# Patient Record
Sex: FEMALE | Race: BLACK OR AFRICAN AMERICAN | NOT HISPANIC OR LATINO | Employment: FULL TIME | URBAN - METROPOLITAN AREA
[De-identification: names, ages, dates, MRNs, and addresses within clinical notes are randomized per-mention and may not be internally consistent; named-entity substitution may affect disease eponyms.]

---

## 2018-01-08 PROBLEM — E88.810 DYSMETABOLIC SYNDROME X: Status: ACTIVE | Noted: 2018-01-08

## 2018-01-08 PROBLEM — G43.009 MIGRAINE WITHOUT AURA AND WITHOUT STATUS MIGRAINOSUS, NOT INTRACTABLE: Status: ACTIVE | Noted: 2018-01-08

## 2018-12-04 PROBLEM — R53.83 OTHER FATIGUE: Status: ACTIVE | Noted: 2018-12-04

## 2019-02-13 PROBLEM — K90.9 INTESTINAL MALABSORPTION: Status: ACTIVE | Noted: 2019-02-13

## 2019-02-13 PROBLEM — R79.89 ABNORMAL THYROID BLOOD TEST: Status: ACTIVE | Noted: 2019-02-13

## 2019-02-14 PROBLEM — E87.6 HYPOKALEMIA: Status: ACTIVE | Noted: 2019-02-14

## 2019-03-14 PROBLEM — E51.9 THIAMINE DEFICIENCY: Status: ACTIVE | Noted: 2019-03-14

## 2022-03-31 PROBLEM — D50.8 IRON DEFICIENCY ANEMIA SECONDARY TO INADEQUATE DIETARY IRON INTAKE: Status: ACTIVE | Noted: 2022-03-31

## 2022-09-19 PROBLEM — E55.9 VITAMIN D DEFICIENCY: Status: ACTIVE | Noted: 2022-09-19

## 2022-09-19 PROBLEM — E04.1 THYROID NODULE: Status: ACTIVE | Noted: 2022-09-19

## 2023-07-18 PROBLEM — F32.0 CURRENT MILD EPISODE OF MAJOR DEPRESSIVE DISORDER WITHOUT PRIOR EPISODE: Status: ACTIVE | Noted: 2023-07-18

## 2023-08-28 ENCOUNTER — TELEPHONE (OUTPATIENT)
Dept: PRIMARY CARE CLINIC | Facility: CLINIC | Age: 43
End: 2023-08-28
Payer: COMMERCIAL

## 2023-08-28 NOTE — TELEPHONE ENCOUNTER
----- Message from Gini Barr sent at 8/28/2023 12:10 PM CDT -----  Type :  Needs Medical Advice    Who Called:  pt   Symptoms (please be specific): est  How long has patient had these symptoms:   est   Pharmacy name and phone #:   no   Would the patient rather a call back or a response via [facility]?  Call   Best Call Back Number:[phone]  Additional Information: appt

## 2023-08-29 ENCOUNTER — TELEPHONE (OUTPATIENT)
Dept: PRIMARY CARE CLINIC | Facility: CLINIC | Age: 43
End: 2023-08-29
Payer: COMMERCIAL

## 2023-08-29 NOTE — TELEPHONE ENCOUNTER
----- Message from Cristal Swanson sent at 8/29/2023  9:04 AM CDT -----  Pt would like to be called back regarding  appt    Pt can be reached at  [phone]

## 2023-09-11 ENCOUNTER — PATIENT MESSAGE (OUTPATIENT)
Dept: PRIMARY CARE CLINIC | Facility: CLINIC | Age: 43
End: 2023-09-11

## 2023-09-11 ENCOUNTER — OFFICE VISIT (OUTPATIENT)
Dept: PRIMARY CARE CLINIC | Facility: CLINIC | Age: 43
End: 2023-09-11
Payer: COMMERCIAL

## 2023-09-11 VITALS
HEIGHT: 70 IN | HEART RATE: 92 BPM | DIASTOLIC BLOOD PRESSURE: 82 MMHG | BODY MASS INDEX: 28.06 KG/M2 | SYSTOLIC BLOOD PRESSURE: 117 MMHG | WEIGHT: 196 LBS

## 2023-09-11 DIAGNOSIS — Z98.84 BARIATRIC SURGERY STATUS: ICD-10-CM

## 2023-09-11 DIAGNOSIS — K59.00 CONSTIPATION, UNSPECIFIED CONSTIPATION TYPE: ICD-10-CM

## 2023-09-11 DIAGNOSIS — R63.5 WEIGHT GAIN, ABNORMAL: ICD-10-CM

## 2023-09-11 DIAGNOSIS — E05.90 SUBCLINICAL HYPERTHYROIDISM: ICD-10-CM

## 2023-09-11 DIAGNOSIS — E78.2 MIXED HYPERLIPIDEMIA: ICD-10-CM

## 2023-09-11 DIAGNOSIS — E55.9 VITAMIN D DEFICIENCY: ICD-10-CM

## 2023-09-11 DIAGNOSIS — E04.1 THYROID NODULE: ICD-10-CM

## 2023-09-11 DIAGNOSIS — G43.009 MIGRAINE WITHOUT AURA AND WITHOUT STATUS MIGRAINOSUS, NOT INTRACTABLE: Primary | ICD-10-CM

## 2023-09-11 DIAGNOSIS — F32.A ANXIETY AND DEPRESSION: ICD-10-CM

## 2023-09-11 DIAGNOSIS — Z86.39 HISTORY OF OBESITY: ICD-10-CM

## 2023-09-11 DIAGNOSIS — F41.9 ANXIETY AND DEPRESSION: ICD-10-CM

## 2023-09-11 PROBLEM — E87.6 HYPOKALEMIA: Status: RESOLVED | Noted: 2019-02-14 | Resolved: 2023-09-11

## 2023-09-11 PROBLEM — R53.83 OTHER FATIGUE: Status: RESOLVED | Noted: 2018-12-04 | Resolved: 2023-09-11

## 2023-09-11 PROBLEM — R79.89 ABNORMAL THYROID BLOOD TEST: Status: RESOLVED | Noted: 2019-02-13 | Resolved: 2023-09-11

## 2023-09-11 PROBLEM — F32.0 CURRENT MILD EPISODE OF MAJOR DEPRESSIVE DISORDER WITHOUT PRIOR EPISODE: Status: RESOLVED | Noted: 2023-07-18 | Resolved: 2023-09-11

## 2023-09-11 PROBLEM — E88.810 DYSMETABOLIC SYNDROME X: Status: RESOLVED | Noted: 2018-01-08 | Resolved: 2023-09-11

## 2023-09-11 PROBLEM — K90.9 INTESTINAL MALABSORPTION: Status: RESOLVED | Noted: 2019-02-13 | Resolved: 2023-09-11

## 2023-09-11 PROBLEM — D50.8 IRON DEFICIENCY ANEMIA SECONDARY TO INADEQUATE DIETARY IRON INTAKE: Status: RESOLVED | Noted: 2022-03-31 | Resolved: 2023-09-11

## 2023-09-11 PROBLEM — E51.9 THIAMINE DEFICIENCY: Status: RESOLVED | Noted: 2019-03-14 | Resolved: 2023-09-11

## 2023-09-11 PROCEDURE — 99205 OFFICE O/P NEW HI 60 MIN: CPT | Mod: 95,,, | Performed by: FAMILY MEDICINE

## 2023-09-11 PROCEDURE — 99205 PR OFFICE/OUTPT VISIT, NEW, LEVL V, 60-74 MIN: ICD-10-PCS | Mod: 95,,, | Performed by: FAMILY MEDICINE

## 2023-09-11 RX ORDER — SEMAGLUTIDE 0.5 MG/.5ML
0.5 INJECTION, SOLUTION SUBCUTANEOUS
Qty: 4 EACH | Refills: 5 | Status: SHIPPED | OUTPATIENT
Start: 2023-09-11 | End: 2023-11-07

## 2023-09-11 RX ORDER — PLECANATIDE 3 MG/1
3 TABLET ORAL DAILY
Qty: 30 TABLET | Refills: 5 | Status: SHIPPED | OUTPATIENT
Start: 2023-09-11 | End: 2023-09-28

## 2023-09-11 RX ORDER — ATOGEPANT 60 MG/1
60 TABLET ORAL DAILY
Qty: 30 TABLET | Refills: 5 | Status: SHIPPED | OUTPATIENT
Start: 2023-09-11 | End: 2023-11-29

## 2023-09-11 RX ORDER — BUPROPION HYDROCHLORIDE 100 MG/1
100 TABLET ORAL 2 TIMES DAILY
Qty: 60 TABLET | Refills: 5 | Status: SHIPPED | OUTPATIENT
Start: 2023-09-11 | End: 2024-02-02

## 2023-09-11 NOTE — ASSESSMENT & PLAN NOTE
Patient is having more weight regain, we will start GLP therapy as Ozempic was doing really good in helping her maintain her weight loss.

## 2023-09-11 NOTE — PROGRESS NOTES
"        [facility]       [address] [place] [address]      [phone]     Analia Foster MD         .      TELEMEDICINE VISIT  09/11/2023    Laura Haddad is a 43 y.o. female who presents for Telemed visit.   The patient location is: [place]    The chief complaint leading to consultation is: is wanting to re establish care from [facility] many years ago.  She was unhappy with the NP she was seeing at my old clinic abnd had issues with how messages was handled. Patient was seeing dr ca in the interim, she is wanting to re establish care. She was started on januvia but it caused her side effects.  Patient is not taking metformin. Patient states her a1c was normal.  She was on ozempic but insurance wouldn't cover.  She is concerned about weight regain. She thinks that wellbutrin she started over the summer was cause of the weight gain. When she reached out to dr ca, she was dismissed and advised to find "someone else" to help her needs.    She was at max weight 330#, 290# before gastric surgery in 2018, and then her lowest weight of 155#. Weight regain started when insurance stopped covering meds and she gained over 20# since this.  This has greatly affected her mood and cost some depressive symptoms and she wants to get the weight back down again.    REVIEW OF SYMPTOMS  All negative except as stated above.      PHYSICAL EXAM:  Alert and awake, Normal appearance, and overweight/obese  Normocephalic, Atraumatic , and Normal facies  EOMI intact and Clear conjunctivae  Normal Ears, Nares patent, and Throat WNL  No cyanosis and No labored breathing  Normal Abdomen, No tenderness/guarding, and Increased Abdominal Girth      Normal mood, Normal affect, Normal behavior, Congruent sppech, Normal memory, Normal speech, depressed, and concerned      ASSESSMENT AND PLAN      1. Migraine without aura and without status migrainosus, not " intractable  Assessment & Plan:  Currently taking Topamax and does still have more migraines.  She does have and a desirable side effects with Topamax.  We will try Qulipta to take every day and then try to taper off the Topamax if tolerated    Orders:  -     atogepant (QULIPTA) 60 mg Tab; Take 60 mg by mouth once daily.  Dispense: 30 tablet; Refill: 5    2. Bariatric surgery status  Overview:  Gastric sleeve in 2018.  Maximum weight 330 lb    Assessment & Plan:  Patient is having more weight regain, we will start GLP therapy as Ozempic was doing really good in helping her maintain her weight loss.    Orders:  -     Anti-Thyroglobulin Antibody; Future; Expected date: 09/11/2023  -     TSH; Future; Expected date: 09/11/2023  -     T4, Free; Future; Expected date: 09/11/2023  -     T3, Free; Future; Expected date: 09/11/2023  -     Thyroid Peroxidase Antibody; Future; Expected date: 09/11/2023  -     C-Peptide; Future; Expected date: 09/11/2023  -     Hemoglobin A1C; Future; Expected date: 09/11/2023  -     CBC Auto Differential; Future; Expected date: 09/11/2023  -     Comprehensive Metabolic Panel; Future; Expected date: 09/11/2023  -     Lipoprotein Analysis, by NMR; Future; Expected date: 09/11/2023  -     Vitamin D 25-Hydroxy; Future; Expected date: 09/11/2023  -     Ferritin; Future; Expected date: 09/11/2023  -     Folate; Future; Expected date: 09/11/2023  -     Iron; Future; Expected date: 09/11/2023  -     Vitamin B12; Future; Expected date: 09/11/2023    3. Weight gain, abnormal  -     Anti-Thyroglobulin Antibody; Future; Expected date: 09/11/2023  -     TSH; Future; Expected date: 09/11/2023  -     T4, Free; Future; Expected date: 09/11/2023  -     T3, Free; Future; Expected date: 09/11/2023  -     Thyroid Peroxidase Antibody; Future; Expected date: 09/11/2023  -     C-Peptide; Future; Expected date: 09/11/2023  -     Hemoglobin A1C; Future; Expected date: 09/11/2023  -     CBC Auto Differential; Future;  Expected date: 09/11/2023  -     Comprehensive Metabolic Panel; Future; Expected date: 09/11/2023  -     Lipoprotein Analysis, by NMR; Future; Expected date: 09/11/2023  -     Vitamin D 25-Hydroxy; Future; Expected date: 09/11/2023  -     Ferritin; Future; Expected date: 09/11/2023  -     Folate; Future; Expected date: 09/11/2023  -     Iron; Future; Expected date: 09/11/2023  -     Vitamin B12; Future; Expected date: 09/11/2023    4. Constipation, unspecified constipation type  Assessment & Plan:  Not improved with water and as needed MiraLax.  We will try Trulance    Orders:  -     plecanatide (TRULANCE) 3 mg Tab; Take 3 mg by mouth once daily.  Dispense: 30 tablet; Refill: 5    5. Mixed hyperlipidemia  Assessment & Plan:  Previously abnormal triglycerides and HDL.  We will consider look at advanced lipids next time    Orders:  -     Anti-Thyroglobulin Antibody; Future; Expected date: 09/11/2023  -     TSH; Future; Expected date: 09/11/2023  -     T4, Free; Future; Expected date: 09/11/2023  -     T3, Free; Future; Expected date: 09/11/2023  -     Thyroid Peroxidase Antibody; Future; Expected date: 09/11/2023  -     C-Peptide; Future; Expected date: 09/11/2023  -     Hemoglobin A1C; Future; Expected date: 09/11/2023  -     CBC Auto Differential; Future; Expected date: 09/11/2023  -     Comprehensive Metabolic Panel; Future; Expected date: 09/11/2023  -     Lipoprotein Analysis, by NMR; Future; Expected date: 09/11/2023  -     Vitamin D 25-Hydroxy; Future; Expected date: 09/11/2023  -     Ferritin; Future; Expected date: 09/11/2023  -     Folate; Future; Expected date: 09/11/2023  -     Iron; Future; Expected date: 09/11/2023  -     Vitamin B12; Future; Expected date: 09/11/2023    6. Anxiety and depression  Assessment & Plan:  Currently on Wellbutrin, but given history of gastric sleeve, may want to try doing immediate release to see if this gives her better effect.  Okay to just take once daily if sleep is an  issue    Orders:  -     buPROPion (WELLBUTRIN) 100 MG tablet; Take 1 tablet (100 mg total) by mouth 2 (two) times daily.  Dispense: 60 tablet; Refill: 5    7. History of obesity  Overview:  There are multiple etiologies of weight gain. A  weight loss plan that focuses on diet, exercise and good healthy lifestyle changes are a necessity to help combat obesity. Medication and/orsurgical options are available;  unfortunately,  many options may not be approved by insurance nor FDA approved for obesity but could be very beneficial. There are possible risks associated with therapeutic options and patient should notify us of any concerns. Patients should adhere to plan and follow up routinely as directed.     Assessment & Plan:  May want to consider Lomaira if we go we is not covered.  She is previously done this and did well on this.    Orders:  -     semaglutide, weight loss, (WEGOVY) 0.5 mg/0.5 mL PnIj; Inject 0.5 mg into the skin every 7 days.  Dispense: 4 each; Refill: 5    8. Subclinical hyperthyroidism  Comments:  We will get full panel.  Orders:  -     Anti-Thyroglobulin Antibody; Future; Expected date: 09/11/2023  -     TSH; Future; Expected date: 09/11/2023  -     T4, Free; Future; Expected date: 09/11/2023  -     T3, Free; Future; Expected date: 09/11/2023  -     Thyroid Peroxidase Antibody; Future; Expected date: 09/11/2023  -     C-Peptide; Future; Expected date: 09/11/2023  -     Hemoglobin A1C; Future; Expected date: 09/11/2023  -     CBC Auto Differential; Future; Expected date: 09/11/2023  -     Comprehensive Metabolic Panel; Future; Expected date: 09/11/2023  -     Lipoprotein Analysis, by NMR; Future; Expected date: 09/11/2023  -     Vitamin D 25-Hydroxy; Future; Expected date: 09/11/2023  -     Ferritin; Future; Expected date: 09/11/2023  -     Folate; Future; Expected date: 09/11/2023  -     Iron; Future; Expected date: 09/11/2023  -     Vitamin B12; Future; Expected date: 09/11/2023    9. Vitamin D  deficiency  Assessment & Plan:  On replacement therapy    Orders:  -     Anti-Thyroglobulin Antibody; Future; Expected date: 09/11/2023  -     TSH; Future; Expected date: 09/11/2023  -     T4, Free; Future; Expected date: 09/11/2023  -     T3, Free; Future; Expected date: 09/11/2023  -     Thyroid Peroxidase Antibody; Future; Expected date: 09/11/2023  -     C-Peptide; Future; Expected date: 09/11/2023  -     Hemoglobin A1C; Future; Expected date: 09/11/2023  -     CBC Auto Differential; Future; Expected date: 09/11/2023  -     Comprehensive Metabolic Panel; Future; Expected date: 09/11/2023  -     Lipoprotein Analysis, by NMR; Future; Expected date: 09/11/2023  -     Vitamin D 25-Hydroxy; Future; Expected date: 09/11/2023  -     Ferritin; Future; Expected date: 09/11/2023  -     Folate; Future; Expected date: 09/11/2023  -     Iron; Future; Expected date: 09/11/2023  -     Vitamin B12; Future; Expected date: 09/11/2023    10. Thyroid nodule  Assessment & Plan:  Repeated abnormal TSH with normal T4 and T3.  We will get full thyroid panel next time.        We will make a few medication changes to see if we can get the weight loss efforts back down.  We will also get patient to upload labs and then if nothing more is needed, we will plan to put labs in to do an next three months.  Interested to see what her thyroid status looks like.  Also we will look at her nutritional levels given her history of bariatric surgery.  We will see if we can get GLP covered for obesity as opposed to for diabetes/prediabetes    I spent a total of 60 minutes face to face and non-face to face on the date of this visit.This includes time preparing to see the patient (eg, review of tests, notes), obtaining and/or reviewing additional history from an independent historian and/or outside medical records, documenting clinical information in the electronic health record, independently interpreting results and/or communicating results to the  "patient/family/caregiver, or care coordinator.    Disclaimer: Portions of this record may have been created with voice recognition software. Occasional wrong-word or "sound-a-like" substitutions may have occurred due to inherent limitations of voice recognition software. Read the chart carefully and recognize, using context, where substitutions have occurred."    Visit type: Telemedicine:audio and visual  Each patient to whom he or she provides medical services by telemedicine is:  (1) informed of the relationship between the physician and patient and the respective role of any other health care provider with respect to management of the patient; and (2) notified that he or she may decline to receive medical services by telemedicine and may withdraw from such care at any time.    Signed by:  Analia Foster MD         "

## 2023-09-11 NOTE — ASSESSMENT & PLAN NOTE
May want to consider Lomaira if we go we is not covered.  She is previously done this and did well on this.

## 2023-09-11 NOTE — ASSESSMENT & PLAN NOTE
Currently on Wellbutrin, but given history of gastric sleeve, may want to try doing immediate release to see if this gives her better effect.  Okay to just take once daily if sleep is an issue

## 2023-09-11 NOTE — ASSESSMENT & PLAN NOTE
Currently taking Topamax and does still have more migraines.  She does have and a desirable side effects with Topamax.  We will try Qulipta to take every day and then try to taper off the Topamax if tolerated

## 2023-09-18 ENCOUNTER — PATIENT MESSAGE (OUTPATIENT)
Dept: PRIMARY CARE CLINIC | Facility: CLINIC | Age: 43
End: 2023-09-18
Payer: COMMERCIAL

## 2023-09-18 DIAGNOSIS — R11.0 NAUSEA: Primary | ICD-10-CM

## 2023-09-18 RX ORDER — ONDANSETRON HYDROCHLORIDE 8 MG/1
8 TABLET, FILM COATED ORAL EVERY 8 HOURS PRN
Qty: 30 TABLET | Refills: 3 | Status: SHIPPED | OUTPATIENT
Start: 2023-09-18

## 2023-09-19 RX ORDER — ONDANSETRON 8 MG/1
8 TABLET, ORALLY DISINTEGRATING ORAL 2 TIMES DAILY
OUTPATIENT
Start: 2023-09-19

## 2023-09-19 RX ORDER — ONDANSETRON 8 MG/1
8 TABLET, ORALLY DISINTEGRATING ORAL 2 TIMES DAILY
COMMUNITY
End: 2023-09-28 | Stop reason: SDUPTHER

## 2023-09-27 ENCOUNTER — PATIENT MESSAGE (OUTPATIENT)
Dept: PRIMARY CARE CLINIC | Facility: CLINIC | Age: 43
End: 2023-09-27
Payer: COMMERCIAL

## 2023-09-27 DIAGNOSIS — R11.0 NAUSEA: Primary | ICD-10-CM

## 2023-09-28 RX ORDER — ONDANSETRON 8 MG/1
8 TABLET, ORALLY DISINTEGRATING ORAL 2 TIMES DAILY
Qty: 30 TABLET | Refills: 0 | Status: SHIPPED | OUTPATIENT
Start: 2023-09-28 | End: 2023-11-02

## 2023-10-19 ENCOUNTER — PATIENT MESSAGE (OUTPATIENT)
Dept: PRIMARY CARE CLINIC | Facility: CLINIC | Age: 43
End: 2023-10-19
Payer: COMMERCIAL

## 2023-10-31 DIAGNOSIS — R11.0 NAUSEA: ICD-10-CM

## 2023-11-02 RX ORDER — ONDANSETRON 8 MG/1
8 TABLET, ORALLY DISINTEGRATING ORAL EVERY 6 HOURS PRN
Qty: 30 TABLET | Refills: 0 | Status: SHIPPED | OUTPATIENT
Start: 2023-11-02 | End: 2023-11-13

## 2023-11-07 ENCOUNTER — PATIENT MESSAGE (OUTPATIENT)
Dept: PRIMARY CARE CLINIC | Facility: CLINIC | Age: 43
End: 2023-11-07
Payer: COMMERCIAL

## 2023-11-07 DIAGNOSIS — Z98.84 BARIATRIC SURGERY STATUS: Primary | ICD-10-CM

## 2023-11-07 RX ORDER — SEMAGLUTIDE 1 MG/.5ML
1 INJECTION, SOLUTION SUBCUTANEOUS
Qty: 4 EACH | Refills: 5 | Status: SHIPPED | OUTPATIENT
Start: 2023-11-07 | End: 2024-01-08

## 2023-11-12 DIAGNOSIS — R11.0 NAUSEA: ICD-10-CM

## 2023-11-13 RX ORDER — ONDANSETRON 8 MG/1
TABLET, ORALLY DISINTEGRATING ORAL
Qty: 30 TABLET | Refills: 1 | Status: SHIPPED | OUTPATIENT
Start: 2023-11-13 | End: 2024-01-11 | Stop reason: SDUPTHER

## 2024-01-08 ENCOUNTER — PATIENT MESSAGE (OUTPATIENT)
Dept: PRIMARY CARE CLINIC | Facility: CLINIC | Age: 44
End: 2024-01-08
Payer: COMMERCIAL

## 2024-01-08 DIAGNOSIS — Z86.39 HISTORY OF OBESITY: Primary | ICD-10-CM

## 2024-01-08 RX ORDER — SEMAGLUTIDE 1.7 MG/.75ML
1.7 INJECTION, SOLUTION SUBCUTANEOUS
Qty: 4 EACH | Refills: 5 | Status: SHIPPED | OUTPATIENT
Start: 2024-01-08 | End: 2024-02-02

## 2024-01-08 RX ORDER — ONDANSETRON 8 MG/1
8 TABLET, ORALLY DISINTEGRATING ORAL 2 TIMES DAILY
Qty: 30 TABLET | Refills: 0 | Status: SHIPPED | OUTPATIENT
Start: 2024-01-08

## 2024-01-10 DIAGNOSIS — R11.0 NAUSEA: ICD-10-CM

## 2024-01-11 RX ORDER — ONDANSETRON 8 MG/1
TABLET, ORALLY DISINTEGRATING ORAL
Qty: 30 TABLET | Refills: 0 | Status: SHIPPED | OUTPATIENT
Start: 2024-01-11

## 2024-02-01 ENCOUNTER — PATIENT MESSAGE (OUTPATIENT)
Dept: PRIMARY CARE CLINIC | Facility: CLINIC | Age: 44
End: 2024-02-01
Payer: COMMERCIAL

## 2024-02-01 DIAGNOSIS — Z98.84 BARIATRIC SURGERY STATUS: ICD-10-CM

## 2024-02-01 DIAGNOSIS — K21.9 GASTROESOPHAGEAL REFLUX DISEASE WITHOUT ESOPHAGITIS: Primary | ICD-10-CM

## 2024-02-01 RX ORDER — PANTOPRAZOLE SODIUM 20 MG/1
20 TABLET, DELAYED RELEASE ORAL DAILY
Qty: 30 TABLET | Refills: 11 | Status: SHIPPED | OUTPATIENT
Start: 2024-02-01 | End: 2024-03-25

## 2024-02-02 DIAGNOSIS — F32.A ANXIETY AND DEPRESSION: ICD-10-CM

## 2024-02-02 DIAGNOSIS — F41.9 ANXIETY AND DEPRESSION: ICD-10-CM

## 2024-02-02 RX ORDER — BUPROPION HYDROCHLORIDE 100 MG/1
100 TABLET ORAL 2 TIMES DAILY
Qty: 180 TABLET | Refills: 1 | Status: SHIPPED | OUTPATIENT
Start: 2024-02-02

## 2024-02-02 RX ORDER — SEMAGLUTIDE 1 MG/.5ML
1 INJECTION, SOLUTION SUBCUTANEOUS
Qty: 4 EACH | Refills: 5 | Status: SHIPPED | OUTPATIENT
Start: 2024-02-02

## 2024-03-18 ENCOUNTER — ON-DEMAND VIRTUAL (OUTPATIENT)
Dept: URGENT CARE | Facility: CLINIC | Age: 44
End: 2024-03-18
Payer: COMMERCIAL

## 2024-03-18 ENCOUNTER — PATIENT MESSAGE (OUTPATIENT)
Dept: PRIMARY CARE CLINIC | Facility: CLINIC | Age: 44
End: 2024-03-18
Payer: COMMERCIAL

## 2024-03-18 DIAGNOSIS — B37.0 THRUSH: Primary | ICD-10-CM

## 2024-03-18 PROCEDURE — 99213 OFFICE O/P EST LOW 20 MIN: CPT | Mod: 95,,, | Performed by: NURSE PRACTITIONER

## 2024-03-18 RX ORDER — NYSTATIN 100000 [USP'U]/ML
6 SUSPENSION ORAL 4 TIMES DAILY
Qty: 240 ML | Refills: 0 | Status: SHIPPED | OUTPATIENT
Start: 2024-03-18 | End: 2024-03-28

## 2024-03-18 NOTE — PROGRESS NOTES
Subjective:      Patient ID: Laura Haddad is a 43 y.o. female.    Vitals:  vitals were not taken for this visit.     Chief Complaint: No chief complaint on file.      Visit Type: TELE AUDIOVISUAL    Present with the patient at the time of consultation: TELEMED PRESENT WITH PATIENT: None        Past Medical History:   Diagnosis Date    Classical migraine     Fibromyalgia     Herpes simplex 2013    Insulin resistance     Mixed hyperlipidemia 9/11/2023    Overactive bladder     Seizure disorder     Subclinical hyperthyroidism 9/11/2023     Past Surgical History:   Procedure Laterality Date    ABDOMINOPLASTY      ADENOIDECTOMY  1981    ANTERIOR VAGINAL REPAIR  2006    arm lift  Bilateral     AUGMENTATION OF BREAST      BUTTOCK LIFT      CARPAL TUNNEL RELEASE  04/04/2023    CHOLECYSTECTOMY  2011    CYSTOSCOPY  09/15/2022    Dr. Monteiro    FOOT SURGERY      HAND SURGERY  04/2023    KNEE ARTHROSCOPY      LAPAROSCOPIC SLEEVE GASTRECTOMY  12/10/2018    LIPOSUCTION      REDUCTION OF BOTH BREASTS  2009    TONSILLECTOMY  1988    TVH  2001    uterine prolapse    TYMPANOSTOMY TUBE PLACEMENT  1981     Review of patient's allergies indicates:   Allergen Reactions    Bactrim [sulfamethoxazole-trimethoprim]     Ciprofloxacin Hives    Marcaine [bupivacaine]      Current Outpatient Medications on File Prior to Visit   Medication Sig Dispense Refill    buPROPion (WELLBUTRIN) 100 MG tablet Take 1 tablet (100 mg total) by mouth 2 (two) times daily. 180 tablet 1    ondansetron (ZOFRAN) 8 MG tablet Take 1 tablet (8 mg total) by mouth every 8 (eight) hours as needed for Nausea. 30 tablet 3    ondansetron (ZOFRAN-ODT) 8 MG TbDL Take 1 tablet (8 mg total) by mouth 2 (two) times daily. 30 tablet 0    ondansetron (ZOFRAN-ODT) 8 MG TbDL PLACE 1 TABLET ON THE TONGUE EVERY 6 HOURS AS NEEDED FOR NAUSEA 30 tablet 0    pantoprazole (PROTONIX) 20 MG tablet Take 1 tablet (20 mg total) by mouth once daily. 30 tablet 11    semaglutide, weight loss,  (WEGOVY) 1 mg/0.5 mL PnIj Inject 1 mg into the skin every 7 days. 4 each 5    topiramate (TOPAMAX) 100 MG tablet Take 1 tablet (100 mg total) by mouth 2 (two) times daily. 180 tablet 1    ubrogepant (UBRELVY) 100 mg tablet Take one tab with onset ofha ;repeat in 2 hours if still with sxs ; max  2 per 24 hours 60 tablet 5    valACYclovir (VALTREX) 500 MG tablet TAKE 1 TABLET DAILY FOR SUPPRESSION. TAKE 1 TABLET TWICE A DAY IF OUTBREAK 108 tablet 5     No current facility-administered medications on file prior to visit.     Family History   Problem Relation Age of Onset    Hyperlipidemia Mother     Diabetes Mother     Hypertension Mother     Heart disease Maternal Grandmother     Prostate cancer Other     Cancer Neg Hx        Medications Ordered                [facility] [place] [address] [address] [facility]   [address] [address]    Telephone: [phone]   Fax: [phone]   Hours: Not open 24 hours                         E-Prescribed (1 of 1)              nystatin (MYCOSTATIN) 100,000 unit/mL suspension    Sig: Take 6 mLs (600,000 Units total) by mouth 4 (four) times daily. for 10 days       Start: 3/18/24     Quantity: 240 mL Refills: 0                           Ohs Peq Odvv Intake    3/18/2024  9:33 AM CDT - Filed by Patient   What is your current physical address in the event of a medical emergency? [address]   Are you able to take your vital signs? No   Please attach any relevant images or files          42 yo with c/o sore throat and white patch on tongue. She is not diabetic. She does not use oral steroids. She states she has congestion. She states she tried to scrape it from her tongue and could not. She also c/o sore throat and some lymph node pain. She denies congestion, fever.         Constitution: Negative.   HENT: Negative.  Positive for tongue pain and tongue lesion.    Cardiovascular: Negative.    Respiratory: Negative.     Gastrointestinal:  Negative.    Endocrine: negative.   Genitourinary: Negative.  Negative for frequency and urgency.   Musculoskeletal: Negative.    Skin: Negative.    Allergic/Immunologic: Negative.    Neurological: Negative.    Hematologic/Lymphatic: Negative.    Psychiatric/Behavioral: Negative.          Objective:   The physical exam was conducted virtually.  LOCATION OF PATIENT work  Physical Exam   Constitutional: She is oriented to person, place, and time. She appears well-developed.   HENT:   Head: Normocephalic and atraumatic.   Ears:   Right Ear: Hearing, tympanic membrane and external ear normal.   Left Ear: Hearing, tympanic membrane and external ear normal.   Nose: Nose normal.   Mouth/Throat: Uvula is midline, oropharynx is clear and moist and mucous membranes are normal.   Eyes: Conjunctivae and EOM are normal. Pupils are equal, round, and reactive to light.   Neck: Neck supple.   Cardiovascular: Normal rate.   Pulmonary/Chest: Effort normal and breath sounds normal.   Musculoskeletal: Normal range of motion.         General: Normal range of motion.   Neurological: She is alert and oriented to person, place, and time.   Skin: Skin is warm.   Psychiatric: Her behavior is normal. Thought content normal.   Nursing note and vitals reviewed.      Assessment:     1. Thrush        Plan:   Discussed with patient that it is unlikely to be a yeast/thrush infection however will treat with nystatin.   I do suspect early viral illness.    Thank you for choosing [facility]!    Our goal in the [facility] is to always provide outstanding medical care. You may receive a survey by mail or e-mail in the next week regarding your experience today. We would greatly appreciate you completing and returning the survey. Your feedback provides us with a way to recognize our staff who provide very good care, and it helps us learn how to improve when your experience was below our aspiration of excellence.          We appreciate you trusting us with your medical care. We hope you feel better soon. We will be happy to take care of you for all of your future medical needs.    You must understand that you've received an Urgent Care treatment only and that you may be released before all your medical problems are known or treated. You, the patient, will arrange for follow up care as instructed.    Follow up with your PCP or specialty clinic as directed in the next 1-2 weeks if not improved or as needed.  You can call [phone] to schedule an appointment with the appropriate provider.    If your condition worsens we recommend that you receive another evaluation in person, with your primary care provider, urgent care or at the emergency room immediately or contact your primary medical clinics after hours call service to discuss your concerns.         Thrush  -     nystatin (MYCOSTATIN) 100,000 unit/mL suspension; Take 6 mLs (600,000 Units total) by mouth 4 (four) times daily. for 10 days  Dispense: 240 mL; Refill: 0

## 2024-03-18 NOTE — PATIENT INSTRUCTIONS
Thank you for choosing [facility]!    Our goal in the [facility] is to always provide outstanding medical care. You may receive a survey by mail or e-mail in the next week regarding your experience today. We would greatly appreciate you completing and returning the survey. Your feedback provides us with a way to recognize our staff who provide very good care, and it helps us learn how to improve when your experience was below our aspiration of excellence.         We appreciate you trusting us with your medical care. We hope you feel better soon. We will be happy to take care of you for all of your future medical needs.    You must understand that you've received an Urgent Care treatment only and that you may be released before all your medical problems are known or treated. You, the patient, will arrange for follow up care as instructed.    Follow up with your PCP or specialty clinic as directed in the next 1-2 weeks if not improved or as needed.  You can call [phone] to schedule an appointment with the appropriate provider.    If your condition worsens we recommend that you receive another evaluation in person, with your primary care provider, urgent care or at the emergency room immediately or contact your primary medical clinics after hours call service to discuss your concerns.

## 2024-03-23 ENCOUNTER — PATIENT MESSAGE (OUTPATIENT)
Dept: PRIMARY CARE CLINIC | Facility: CLINIC | Age: 44
End: 2024-03-23
Payer: COMMERCIAL

## 2024-03-23 DIAGNOSIS — Z98.84 BARIATRIC SURGERY STATUS: Primary | ICD-10-CM

## 2024-03-24 DIAGNOSIS — R11.0 NAUSEA: ICD-10-CM

## 2024-03-24 DIAGNOSIS — K21.9 GASTROESOPHAGEAL REFLUX DISEASE WITHOUT ESOPHAGITIS: ICD-10-CM

## 2024-03-25 RX ORDER — ONDANSETRON 8 MG/1
TABLET, ORALLY DISINTEGRATING ORAL
Qty: 30 TABLET | Refills: 0 | Status: SHIPPED | OUTPATIENT
Start: 2024-03-25

## 2024-03-25 RX ORDER — SEMAGLUTIDE 2.4 MG/.75ML
2.4 INJECTION, SOLUTION SUBCUTANEOUS
Qty: 4 EACH | Refills: 5 | Status: SHIPPED | OUTPATIENT
Start: 2024-03-25

## 2024-03-25 RX ORDER — PANTOPRAZOLE SODIUM 20 MG/1
20 TABLET, DELAYED RELEASE ORAL DAILY
Qty: 90 TABLET | Refills: 0 | Status: SHIPPED | OUTPATIENT
Start: 2024-03-25

## 2024-04-22 DIAGNOSIS — G43.519 MIGRAINE AURA, PERSISTENT, INTRACTABLE: ICD-10-CM

## 2024-04-22 RX ORDER — UBROGEPANT 100 MG/1
TABLET ORAL
Qty: 10 TABLET | Refills: 0 | Status: SHIPPED | OUTPATIENT
Start: 2024-04-22 | End: 2024-05-23

## 2024-04-28 RX ORDER — ONDANSETRON 8 MG/1
TABLET, ORALLY DISINTEGRATING ORAL
Qty: 30 TABLET | Refills: 44 | OUTPATIENT
Start: 2024-04-28

## 2024-05-21 DIAGNOSIS — G43.519 MIGRAINE AURA, PERSISTENT, INTRACTABLE: ICD-10-CM

## 2024-05-23 RX ORDER — UBROGEPANT 100 MG/1
TABLET ORAL
Qty: 10 TABLET | Refills: 0 | Status: SHIPPED | OUTPATIENT
Start: 2024-05-23

## 2025-03-19 ENCOUNTER — OFFICE VISIT (OUTPATIENT)
Dept: PRIMARY CARE CLINIC | Facility: CLINIC | Age: 45
End: 2025-03-19
Payer: COMMERCIAL

## 2025-03-19 VITALS
DIASTOLIC BLOOD PRESSURE: 82 MMHG | WEIGHT: 226.19 LBS | HEIGHT: 70 IN | RESPIRATION RATE: 18 BRPM | TEMPERATURE: 97 F | BODY MASS INDEX: 32.38 KG/M2 | SYSTOLIC BLOOD PRESSURE: 120 MMHG | HEART RATE: 88 BPM | OXYGEN SATURATION: 99 %

## 2025-03-19 DIAGNOSIS — B37.0 THRUSH: ICD-10-CM

## 2025-03-19 DIAGNOSIS — K21.9 GASTROESOPHAGEAL REFLUX DISEASE, UNSPECIFIED WHETHER ESOPHAGITIS PRESENT: ICD-10-CM

## 2025-03-19 DIAGNOSIS — R63.5 WEIGHT GAIN, ABNORMAL: Primary | ICD-10-CM

## 2025-03-19 DIAGNOSIS — G43.009 MIGRAINE WITHOUT AURA AND WITHOUT STATUS MIGRAINOSUS, NOT INTRACTABLE: ICD-10-CM

## 2025-03-19 DIAGNOSIS — Z98.84 BARIATRIC SURGERY STATUS: ICD-10-CM

## 2025-03-19 DIAGNOSIS — E55.9 VITAMIN D DEFICIENCY: ICD-10-CM

## 2025-03-19 DIAGNOSIS — K31.84 GASTROPARESIS: ICD-10-CM

## 2025-03-19 DIAGNOSIS — E78.2 MIXED HYPERLIPIDEMIA: ICD-10-CM

## 2025-03-19 DIAGNOSIS — N81.6 RECTOCELE: ICD-10-CM

## 2025-03-19 DIAGNOSIS — N32.81 OAB (OVERACTIVE BLADDER): ICD-10-CM

## 2025-03-19 DIAGNOSIS — Z76.89 ENCOUNTER TO ESTABLISH CARE: ICD-10-CM

## 2025-03-19 DIAGNOSIS — E66.09 CLASS 1 OBESITY DUE TO EXCESS CALORIES WITH BODY MASS INDEX (BMI) OF 32.0 TO 32.9 IN ADULT, UNSPECIFIED WHETHER SERIOUS COMORBIDITY PRESENT: ICD-10-CM

## 2025-03-19 DIAGNOSIS — R11.0 NAUSEA: ICD-10-CM

## 2025-03-19 DIAGNOSIS — E66.811 CLASS 1 OBESITY DUE TO EXCESS CALORIES WITH BODY MASS INDEX (BMI) OF 32.0 TO 32.9 IN ADULT, UNSPECIFIED WHETHER SERIOUS COMORBIDITY PRESENT: ICD-10-CM

## 2025-03-19 PROBLEM — M51.9 LUMBAR DISC DISEASE: Status: ACTIVE | Noted: 2018-06-01

## 2025-03-19 PROBLEM — M53.3 SACROILIAC JOINT PAIN: Status: ACTIVE | Noted: 2025-03-19

## 2025-03-19 PROBLEM — M25.559 HIP PAIN: Status: ACTIVE | Noted: 2025-03-19

## 2025-03-19 PROBLEM — M51.16 HERNIATION OF LUMBAR INTERVERTEBRAL DISC WITH RADICULOPATHY: Status: ACTIVE | Noted: 2025-03-19

## 2025-03-19 PROBLEM — G47.00 INSOMNIA: Status: ACTIVE | Noted: 2024-09-11

## 2025-03-19 PROBLEM — G43.909 MIGRAINE SYNDROME: Status: ACTIVE | Noted: 2024-12-27

## 2025-03-19 PROBLEM — Z86.39 HISTORY OF VITAMIN D DEFICIENCY: Status: ACTIVE | Noted: 2024-09-29

## 2025-03-19 PROCEDURE — 3074F SYST BP LT 130 MM HG: CPT | Mod: CPTII,S$GLB,, | Performed by: FAMILY MEDICINE

## 2025-03-19 PROCEDURE — G2211 COMPLEX E/M VISIT ADD ON: HCPCS | Mod: S$GLB,,, | Performed by: FAMILY MEDICINE

## 2025-03-19 PROCEDURE — 3079F DIAST BP 80-89 MM HG: CPT | Mod: CPTII,S$GLB,, | Performed by: FAMILY MEDICINE

## 2025-03-19 PROCEDURE — 99999 PR PBB SHADOW E&M-EST. PATIENT-LVL V: CPT | Mod: PBBFAC,,, | Performed by: FAMILY MEDICINE

## 2025-03-19 PROCEDURE — 3008F BODY MASS INDEX DOCD: CPT | Mod: CPTII,S$GLB,, | Performed by: FAMILY MEDICINE

## 2025-03-19 PROCEDURE — 99215 OFFICE O/P EST HI 40 MIN: CPT | Mod: S$GLB,,, | Performed by: FAMILY MEDICINE

## 2025-03-19 PROCEDURE — 1160F RVW MEDS BY RX/DR IN RCRD: CPT | Mod: CPTII,S$GLB,, | Performed by: FAMILY MEDICINE

## 2025-03-19 PROCEDURE — 1159F MED LIST DOCD IN RCRD: CPT | Mod: CPTII,S$GLB,, | Performed by: FAMILY MEDICINE

## 2025-03-19 RX ORDER — ONDANSETRON 8 MG/1
8 TABLET, ORALLY DISINTEGRATING ORAL EVERY 6 HOURS PRN
Qty: 30 TABLET | Refills: 11 | Status: SHIPPED | OUTPATIENT
Start: 2025-03-19

## 2025-03-19 RX ORDER — MIRABEGRON 25 MG/1
25 TABLET, FILM COATED, EXTENDED RELEASE ORAL DAILY
Qty: 30 TABLET | Refills: 11 | Status: SHIPPED | OUTPATIENT
Start: 2025-03-19 | End: 2026-03-19

## 2025-03-19 RX ORDER — PHENTERMINE HYDROCHLORIDE 37.5 MG/1
37.5 TABLET ORAL
Qty: 30 TABLET | Refills: 0 | Status: SHIPPED | OUTPATIENT
Start: 2025-03-19 | End: 2025-04-18

## 2025-03-19 RX ORDER — TOPIRAMATE 50 MG/1
1 TABLET, FILM COATED ORAL EVERY MORNING
COMMUNITY
Start: 2025-02-26 | End: 2025-03-19 | Stop reason: SDUPTHER

## 2025-03-19 RX ORDER — PHENTERMINE HYDROCHLORIDE 15 MG/1
15 CAPSULE ORAL EVERY MORNING
COMMUNITY
Start: 2025-02-26 | End: 2025-03-19

## 2025-03-19 RX ORDER — TOPIRAMATE 50 MG/1
50 TABLET, FILM COATED ORAL EVERY MORNING
Qty: 90 TABLET | Refills: 3 | Status: SHIPPED | OUTPATIENT
Start: 2025-03-19

## 2025-03-19 RX ORDER — ESOMEPRAZOLE MAGNESIUM 40 MG/1
40 CAPSULE, DELAYED RELEASE ORAL
Qty: 30 CAPSULE | Refills: 11 | Status: SHIPPED | OUTPATIENT
Start: 2025-03-19 | End: 2026-03-19

## 2025-03-19 NOTE — PROGRESS NOTES
Chief Complaint  Chief Complaint   Patient presents with    Establish Care       \[facility]\""  Laura Haddad is a 44 y.o. female with multiple medical diagnoses as listed in the medical history and problem list that presents for  in person visit. New patient to me. Has seen Dr. Thalia Foster at [facility] on 9/11/23    History of Present Illness    CHIEF COMPLAINT:  - Patient presents to establish care with a new PCP and to address weight management concerns.    HPI:  Patient is a 44-year-old female presenting to establish care with a new PCP. She was previously seeing Dr. Brianna Bojorquez and Dr. Maribel Foster. She reports ongoing weight management concerns. She had weight loss surgery in 2018 and lost a significant amount of weight. She was previously on Ozempic and then Wegovy, which helped her maintain a weight between 160-170 lbs. Since stopping these medications, her weight has increased. In December 2024, she weighed 210 lbs, and now weighs 225 lbs, a 15-pound gain in approximately 3 months.    She reports significant acid reflux symptoms, including acid regurgitation, frequent eructation, and substernal discomfort. These symptoms occur with any oral intake. She has been taking OTC medications along with her prescribed Protonix, effectively doubling her dose to 80mg daily without relief.    She mentions having a persistent post-nasal drip in her left nostril since having COVID-19 in August 2024, causing frequent nausea. She uses Zofran for nausea management. She was recently evaluated by an ENT specialist who did not identify any structural issues related to this symptom.    She reports recurrent oral thrush, for which she was evaluated by an ENT about 2 months ago. She was prescribed Diflucan and two other prescriptions, but the condition keeps recurring.    She has a rectocele and cystocele, diagnosed by Dr. Goldstein, who recommended she contact Dr. Lyons for further evaluation and a colonoscopy. She has not pursued this  due to concerns about potential surgery and her inability to take leave from her new job.    She is currently taking Phentermine 15mg for weight management but reports it is ineffective at suppressing her appetite. She is also taking Topamax 50mg daily, which she restarted recently after stopping it in early 2024. She had previously been on 100mg twice daily from 2012 to 2024 for migraine prevention but reports it is not currently impacting her migraines.    She denies any current thyroid issues, diabetes, or pre-diabetes.    MEDICATIONS:  - Topamax 50 mg, daily, for migraines  - Phentermine 15 mg, daily, for weight loss (patient reports it is not effective)  - Zofran, as needed, for nausea  - Detrol, for overactive bladder  - Protonix 20 mg, patient reports taking 80 mg daily (doubling up on 20 mg tablets and using OTC medications), for acid reflux  - Valtrex, 1 tablet daily for herpes suppression, 1 tablet twice daily for outbreaks  - Discontinued Wellbutrin  - Discontinued hydrochlorothiazide after trying it once, as patient reported it was not effective  - Discontinued Qysmia (phentermine and topiramate mix) due to possible side effect of blisters in the mouth  - Discontinued Topamax 100 mg twice daily in early 2024, previously taken from 2012 to 2024  - Discontinued Ozempic- denied by insurance   - Discontinued Wegovy - denied by insurance     PMH:  - Fibromyalgia  - Thyroid issues  - Vitamin D deficiency  - Migraines  - Gastroparesis  - Recurrent bacterial vaginosis (BV)  - Herpes simplex virus (HSV)  - Rectocele  - Cystocele  - Overactive bladder  - Hysterectomy: Complete, 2001    RECENT/REMOTE SURGICAL HISTORY:  - Total bilateral hysterectomy: 2001, ovaries retained  - Weight loss surgery (sleeve gastrectomy): 2018    SOCIAL HISTORY:  - 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   Daughter lives in [place], [place]  Marital status:  for 25 years          [facility]    3/12/2025  9:54 AM CDT - Filed by Patient   This questionnaire should take approximately 5 to 10 minutes to complete.  To begin, press Let's Begin and then press Continue. Let's Begin   On average, how many days per week do you engage in moderate to strenuous exercise (like a brisk walk)? 2 days   On average, how many minutes do you engage in exercise at this level? 30 min   Do you feel stress - tense, restless, nervous, or anxious, or unable to sleep at night because your mind is troubled all the time - these days? To some extent   How often do you feel lonely or isolated from those around you? Rarely   How often do you need to have someone help you when you read instructions, pamphlets, or other written material from your doctor or pharmacy? Never   How hard is it for you to pay for the very basics like food, housing, medical care, and heating? Not hard at all   In the past 12 months has the electric, gas, oil, or water company threatened to shut off services in your home? No   Within the past 12 months, you worried that your food would run out before you got the money to buy more. Never true   Within the past 12 months, the food you bought just didn't last and you didn't have money to get more. Never true   In the past 12 months, has lack of transportation kept you from medical appointments or from getting medications? No   In the past 12 months, has lack of transportation kept you from meetings, work, or from getting things needed for daily living? No   In the last 12 months, was there a time when you were not able to pay the mortgage or rent on time? No   In the past 12 months, how many times have you moved where you were living? 0   At any time in the past 12 months, were you homeless or living in a shelter (including now)? No   How often do you have a drink containing alcohol? Never   How many drinks containing alcohol do you have on a typical day when you are drinking? Patient does  "not drink   How often do you have six or more drinks on one occasion? Never            Pmh, Psh, Family Hx, Social Hx, HM updated in Epic Tabs today.    Review of Systems   Constitutional:  Positive for activity change, appetite change, fatigue and unexpected weight change.   HENT:  Negative for trouble swallowing and voice change.         Tongue pain    Eyes:  Negative for photophobia and visual disturbance.   Respiratory:  Negative for cough and shortness of breath.    Cardiovascular:  Negative for chest pain, palpitations and leg swelling.   Gastrointestinal:  Positive for constipation, nausea and rectal pain. Negative for abdominal distention, abdominal pain and vomiting.   Endocrine: Negative for polydipsia, polyphagia and polyuria.   Genitourinary:  Positive for pelvic pain. Negative for difficulty urinating.   Musculoskeletal:  Negative for arthralgias, back pain and myalgias.   Neurological:  Positive for headaches. Negative for tremors, weakness and light-headedness.   Psychiatric/Behavioral:  Negative for dysphoric mood and sleep disturbance. The patient is not nervous/anxious.         Objective:     Vitals:    03/19/25 0816   BP: 120/82   BP Location: Right arm   Patient Position: Sitting   Pulse: 88   Resp: 18   Temp: 97 °F (36.1 °C)   TempSrc: Tympanic   SpO2: 99%   Weight: 102.6 kg (226 lb 3.1 oz)   Height: 5' 10" (1.778 m)     Wt Readings from Last 10 Encounters:   03/19/25 102.6 kg (226 lb 3.1 oz)   01/08/25 94.8 kg (209 lb)   11/29/23 83.5 kg (184 lb)   09/28/23 86.2 kg (190 lb)   09/11/23 88.9 kg (196 lb)   07/18/23 83.9 kg (185 lb)   07/06/23 83 kg (183 lb)   04/11/23 83 kg (183 lb)   01/18/23 78.9 kg (174 lb)   09/19/22 78.9 kg (174 lb)     Physical Exam    TEST RESULTS:  - Thyroid: September 2024, normal  - Electrolytes and kidney function: December 2024, good  - Fasting insulin: December 2024, 4 (good)  - A1C: December 2024, 4.9 (normal)  - Iron studies: September 2024, good  - Vitamin D: " September 2024, 52 (very good)  - Lipid profile: April 2024, very good  IMAGING:  - Upper endoscopy: Results suggested possible hiatal hernia and gastroparesis       Physical Exam  Vitals reviewed.   Constitutional:       Appearance: Normal appearance. She is well-developed and well-groomed. She is obese.   HENT:      Head: Normocephalic and atraumatic.      Right Ear: Tympanic membrane and external ear normal.      Left Ear: Tympanic membrane and external ear normal.      Nose: Nose normal.      Mouth/Throat:      Mouth: Mucous membranes are moist.      Tongue: Lesions present.      Pharynx: Oropharynx is clear.        Comments: White patches on tongue consistent with thrush  Eyes:      Conjunctiva/sclera: Conjunctivae normal.      Pupils: Pupils are equal, round, and reactive to light.   Neck:      Thyroid: No thyromegaly.   Cardiovascular:      Rate and Rhythm: Normal rate and regular rhythm.      Heart sounds: Normal heart sounds. No murmur heard.     No friction rub. No gallop.   Pulmonary:      Effort: Pulmonary effort is normal. No respiratory distress.      Breath sounds: Normal breath sounds. No wheezing or rales.   Abdominal:      General: Bowel sounds are normal. There is no distension.      Palpations: Abdomen is soft.      Tenderness: There is no abdominal tenderness. There is no rebound.   Musculoskeletal:         General: Normal range of motion.      Cervical back: Normal range of motion and neck supple.   Lymphadenopathy:      Cervical: No cervical adenopathy.   Skin:     General: Skin is warm and dry.      Findings: No rash.   Neurological:      General: No focal deficit present.      Mental Status: She is alert and oriented to person, place, and time. Mental status is at baseline.   Psychiatric:         Attention and Perception: Attention and perception normal.         Mood and Affect: Mood and affect normal.         Speech: Speech normal.         Behavior: Behavior normal. Behavior is cooperative.          Thought Content: Thought content normal.         Cognition and Memory: Cognition and memory normal.         Judgment: Judgment normal.         Assessment:   LABS:   Lab Results   Component Value Date    HGBA1C 4.9 12/27/2024    HGBA1C 4.7 09/10/2024      Lab Results   Component Value Date    CHOL 174 07/03/2023     Lab Results   Component Value Date    LDLCALC 74 07/03/2023    LDLCALC 7 07/03/2023     Lab Results   Component Value Date    WBC 5.4 12/04/2018    HGB 12.9 12/04/2018    HCT 39.3 12/04/2018     12/04/2018    CHOL 174 07/03/2023    TRIG 38 (A) 07/03/2023    HDL 75 (A) 07/03/2023    ALT 19 12/27/2021    AST 22 12/27/2021     03/30/2023    K 3.7 03/30/2023     12/04/2018    CREATININE 0.83 03/30/2023    BUN 17 03/30/2023    CO2 26 03/30/2023    TSH 0.43 09/10/2024    INR 1.0 12/04/2018    HGBA1C 4.9 12/27/2024       Plan:   Assessment & Plan    R63.5 Weight gain, abnormal  Z98.84 Bariatric surgery status  Z86.39 History of obesity  G43.009 Migraine without aura and without status migrainosus, not intractable  E55.9 Vitamin D deficiency  E78.2 Mixed hyperlipidemia  R11.0 Nausea  K21.9 Gastroesophageal reflux disease, unspecified whether esophagitis present  K31.84 Gastroparesis  N81.6 Rectocele  N32.81 OAB (overactive bladder)  B37.0 Thrush    IMPRESSION:  - Assessed weight management history, including previous use of GLP-1 agonists and weight loss surgery.  - Considered current medications and potential impact on weight and GI symptoms.  - Evaluated thyroid function based on recent lab results, noting no current need for thyroid medication.  - Reviewed recent labs (A1C, fasting insulin, lipid profile), confirming no diabetes or prediabetes.  - Assessed reflux symptoms and considered potential gastroparesis.  - Evaluated current overactive bladder medication (Detrol) and potential contraindication with gastroparesis.  - Considered need for updated endoscopy and colonoscopy given  ongoing GI symptoms.  - Explained [facility] digital weight loss program, including benefits, cost savings, and accountability features.  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    PLAN SUMMARY:  - Started Nexium 40 mg daily, replacing Protonix  - Started Myrbetriq for overactive bladder after 1 week if stomach symptoms improve  - Restarted nystatin for oral thrush, to be kept refrigerated  - Continued Topamax 50 mg daily  - Discontinued Detrol due to potential contraindication with gastroparesis symptoms  - Increased Phentermine from 15 mg to 37.5 mg daily  - Refilled Zofran as needed for nausea  - Referred to colorectal surgeon for evaluation of rectocele and cystocele  - Referred to GI specialist for evaluation of reflux  - Referred to GI specialist for evaluation of potential gastroparesis  - Patient to sign up for [facility] digital weight loss program with 's insurance plan benefit for GLP Zepbound and Wegovy.   - Patient to implement workplace changes to support weight loss  - Follow up in 1 month to assess medication changes and weight loss progress    WEIGHT GAIN, ABNORMAL:  - Patient to sign up for [facility] digital weight loss program.  - Increased Phentermine from 15 mg to 37.5 mg daily.  - Follow up in 1 month to assess medication changes and progress with weight loss program.    MIGRAINE WITHOUT AURA AND WITHOUT STATUS MIGRAINOSUS, NOT INTRACTABLE:  - Continued Topamax 50 mg daily.    NAUSEA:  - Refilled Zofran as needed for nausea.    GASTROESOPHAGEAL REFLUX DISEASE, UNSPECIFIED WHETHER ESOPHAGITIS PRESENT:  - Started Nexium 40 mg daily, replacing Protonix.  - Referred to GI specialist (Dr. Ariela Chen or Dr. Shelby) for evaluation of reflux.    GASTROPARESIS:  - Discontinued Detrol due to potential contraindication with gastroparesis symptoms.  - Referred to GI specialist (Dr. Chen or Dr. Shelby) for evaluation of potential gastroparesis.    RECTOCELE:  - Referred  to colorectal surgeon (Dr. Godwin or Dr. Rico) for evaluation of rectocele and cystocele.    OAB (OVERACTIVE BLADDER):  - Started Myrbetriq for overactive bladder, to be initiated after 1 week if stomach symptoms improve.    THRUSH:  - Educated on importance of cleaning reusable straws and cups to prevent oral thrush.  - Patient to clean or replace reusable items, especially those used at work.  - For certain plastics, boiling water should be used to preve  nt yeast growth.  - Restarted nystatin for oral thrush, to be kept refrigerated.    LIFESTYLE CHANGES:  - Patient to implement workplace changes: remove snacks from desk to reduce mindless eating, set timer for hourly movement breaks, and increase water intake.       Laura was seen today for establish care.    Diagnoses and all orders for this visit:    Weight gain, abnormal  -     phentermine (ADIPEX-P) 37.5 mg tablet; Take 1 tablet (37.5 mg total) by mouth before breakfast.    Encounter to establish care    Gastroesophageal reflux disease, unspecified whether esophagitis present  -     esomeprazole (NEXIUM) 40 MG capsule; Take 1 capsule (40 mg total) by mouth before breakfast.  -     Ambulatory referral/consult to Gastroenterology; Future  -     Ambulatory referral/consult to Colorectal Surgery; Future    Bariatric surgery status  -     phentermine (ADIPEX-P) 37.5 mg tablet; Take 1 tablet (37.5 mg total) by mouth before breakfast.    Class 1 obesity due to excess calories with body mass index (BMI) of 32.0 to 32.9 in adult, unspecified whether serious comorbidity present  -     phentermine (ADIPEX-P) 37.5 mg tablet; Take 1 tablet (37.5 mg total) by mouth before breakfast.    OAB (overactive bladder)  -     mirabegron (MYRBETRIQ) 25 mg Tb24 ER tablet; Take 1 tablet (25 mg total) by mouth once daily.    Gastroparesis  -     Ambulatory referral/consult to Gastroenterology; Future    Rectocele  -     Ambulatory referral/consult to Colorectal Surgery;  Future    Nausea  -     ondansetron (ZOFRAN-ODT) 8 MG TbDL; Take 1 tablet (8 mg total) by mouth every 6 (six) hours as needed (nausea).    Migraine without aura and without status migrainosus, not intractable  -     topiramate (TOPAMAX) 50 MG tablet; Take 1 tablet (50 mg total) by mouth every morning.    Thrush    Vitamin D deficiency    Mixed hyperlipidemia        No image results found.    The 10-year ASCVD risk score (Chasity DK, et al., 2019) is: 0.4%    Values used to calculate the score:      Age: 44 years      Sex: Female      Is Non- : Yes      Diabetic: No      Tobacco smoker: No      Systolic Blood Pressure: 120 mmHg      Is BP treated: No      HDL Cholesterol: 75 mg/dL      Total Cholesterol: 174 mg/dL    Follow-up: Follow up in about 1 month (around 4/19/2025) for f/u OV Dr. Branch- 1mo .    I spent a total of  60     minutes face to face and non-face to face on the date of this visit.This includes time preparing to see the patient (eg, review of tests, notes), obtaining and/or reviewing additional history from an independent historian and/or outside medical records, documenting clinical information in the electronic health record, independently interpreting results and/or communicating results to the patient/family/caregiver, or care coordinator.  Visit today included increased complexity associated with the care of the episodic problem addressed and managing the longitudinal care of the patient due to the serious and/or complex managed problem(s).    This note was generated with the assistance of ambient listening technology. Verbal consent was obtained by the patient and accompanying visitor(s) for the recording of patient appointment to facilitate this note. I attest to having reviewed and edited the generated note for accuracy, though some syntax or spelling errors may persist. Please contact the author of this note for any clarification.       Patient Instructions   For the  enrollment with the Digital Weight Program through [facility] for employees:     [email or web address]    Go to [email or web address] and complete the consent form.     Then you will download the digital medicine application and add your initial weight.   After this the team will contact you regarding the program.

## 2025-03-19 NOTE — PATIENT INSTRUCTIONS
For the enrollment with the Digital Weight Program through [facility] for employees:     [email or web address]    Go to [email or web address] and complete the consent form.     Then you will download the digital medicine application and add your initial weight.   After this the team will contact you regarding the program.

## 2025-03-25 ENCOUNTER — PATIENT MESSAGE (OUTPATIENT)
Dept: PRIMARY CARE CLINIC | Facility: CLINIC | Age: 45
End: 2025-03-25
Payer: COMMERCIAL

## 2025-03-25 DIAGNOSIS — K31.84 GASTROPARESIS: ICD-10-CM

## 2025-03-25 DIAGNOSIS — Z98.84 BARIATRIC SURGERY STATUS: ICD-10-CM

## 2025-03-25 DIAGNOSIS — K21.9 GASTROESOPHAGEAL REFLUX DISEASE, UNSPECIFIED WHETHER ESOPHAGITIS PRESENT: Primary | ICD-10-CM

## 2025-03-27 NOTE — TELEPHONE ENCOUNTER
I have signed for the following orders AND/OR meds.  Please call the patient and ask the patient to schedule the testing AND/OR inform about any medications that were sent.      Orders Placed This Encounter   Procedures    Ambulatory referral/consult to Gastroenterology     Standing Status:   Future     Expected Date:   4/3/2025     Expiration Date:   4/27/2026     Referral Priority:   Routine     Referral Type:   Consultation     Referral Reason:   Specialty Services Required     Referred to Provider:   Dayami Shelby MD     Requested Specialty:   Gastroenterology     Number of Visits Requested:   1

## 2025-04-23 ENCOUNTER — OFFICE VISIT (OUTPATIENT)
Dept: PRIMARY CARE CLINIC | Facility: CLINIC | Age: 45
End: 2025-04-23
Payer: COMMERCIAL

## 2025-04-23 ENCOUNTER — PATIENT MESSAGE (OUTPATIENT)
Dept: PRIMARY CARE CLINIC | Facility: CLINIC | Age: 45
End: 2025-04-23

## 2025-04-23 VITALS
BODY MASS INDEX: 31.75 KG/M2 | RESPIRATION RATE: 18 BRPM | TEMPERATURE: 97 F | WEIGHT: 221.81 LBS | OXYGEN SATURATION: 97 % | SYSTOLIC BLOOD PRESSURE: 120 MMHG | HEART RATE: 86 BPM | HEIGHT: 70 IN | DIASTOLIC BLOOD PRESSURE: 80 MMHG

## 2025-04-23 DIAGNOSIS — Z78.9 MEDICATION INTOLERANCE: ICD-10-CM

## 2025-04-23 DIAGNOSIS — G43.009 MIGRAINE WITHOUT AURA AND WITHOUT STATUS MIGRAINOSUS, NOT INTRACTABLE: ICD-10-CM

## 2025-04-23 DIAGNOSIS — K21.9 GASTROESOPHAGEAL REFLUX DISEASE, UNSPECIFIED WHETHER ESOPHAGITIS PRESENT: ICD-10-CM

## 2025-04-23 DIAGNOSIS — Z98.84 BARIATRIC SURGERY STATUS: ICD-10-CM

## 2025-04-23 DIAGNOSIS — Z12.31 ENCOUNTER FOR SCREENING MAMMOGRAM FOR BREAST CANCER: ICD-10-CM

## 2025-04-23 DIAGNOSIS — R63.5 WEIGHT GAIN, ABNORMAL: Primary | ICD-10-CM

## 2025-04-23 DIAGNOSIS — K59.04 CHRONIC IDIOPATHIC CONSTIPATION: ICD-10-CM

## 2025-04-23 DIAGNOSIS — K31.84 GASTROPARESIS: ICD-10-CM

## 2025-04-23 PROCEDURE — 99215 OFFICE O/P EST HI 40 MIN: CPT | Mod: S$GLB,,, | Performed by: FAMILY MEDICINE

## 2025-04-23 PROCEDURE — 3074F SYST BP LT 130 MM HG: CPT | Mod: CPTII,S$GLB,, | Performed by: FAMILY MEDICINE

## 2025-04-23 PROCEDURE — 3079F DIAST BP 80-89 MM HG: CPT | Mod: CPTII,S$GLB,, | Performed by: FAMILY MEDICINE

## 2025-04-23 PROCEDURE — 99999 PR PBB SHADOW E&M-EST. PATIENT-LVL III: CPT | Mod: PBBFAC,,, | Performed by: FAMILY MEDICINE

## 2025-04-23 PROCEDURE — 3008F BODY MASS INDEX DOCD: CPT | Mod: CPTII,S$GLB,, | Performed by: FAMILY MEDICINE

## 2025-04-23 PROCEDURE — G2211 COMPLEX E/M VISIT ADD ON: HCPCS | Mod: S$GLB,,, | Performed by: FAMILY MEDICINE

## 2025-04-23 RX ORDER — RIMEGEPANT SULFATE 75 MG/75MG
75 TABLET, ORALLY DISINTEGRATING ORAL ONCE AS NEEDED
Qty: 24 TABLET | Refills: 3 | Status: SHIPPED | OUTPATIENT
Start: 2025-04-23 | End: 2025-04-26

## 2025-04-23 RX ORDER — PHENTERMINE HYDROCHLORIDE 15 MG/1
30 CAPSULE ORAL EVERY MORNING
Qty: 60 CAPSULE | Refills: 0 | Status: SHIPPED | OUTPATIENT
Start: 2025-04-23 | End: 2025-05-23

## 2025-04-23 NOTE — PATIENT INSTRUCTIONS
[facility], here is the contact information.     If you have specific questions about your statement, call [facility] Patient Account Customer Service Department Monday - Thursday from 7:30 a.m. to 6:00 p.m. and Friday from 8:00 a.m. and 4:30 p.m.    To contact us in writing:    [facility]  [address] [place] [address]    To contact us by telephone:    Phone: [phone]  Toll Free: [phone]    To contact us by email: [email or web address]

## 2025-04-24 NOTE — PROGRESS NOTES
Chief Complaint  Chief Complaint   Patient presents with    Follow-up     1 month        HPI  Laura Haddad is a 44 y.o. female with multiple medical diagnoses as listed in the medical history and problem list that presents for  in person visit.     History of Present Illness    CHIEF COMPLAINT:  - Patient presents for a follow-up visit to discuss weight management issues and medication adjustments.    HPI:  Patient is following up after an initial visit on March 19th for weight management issues. She had weight loss surgery in 2018 and was previously on Ozempic and Wegovy. Her weight increased after stopping these medications. In December 2014, she weighed 210 lbs and recently increased to 225 lbs, representing a 15-pound weight gain over the last 3 months. She also reported acid reflux symptoms.    Patient reports significant improvement in managing symptoms with Nexium replacing Plavix. She was prescribed Myrbetriq for overactive bladder symptoms during the last visit but did not fill the prescription and reports no current issues with overactive bladder.    For weight management, patient was prescribed Phentermine. She initially took 15mg daily without effect, then doubled the dose to 30mg daily using two 15mg capsules. She reports noticing a significant change with the increased dose, noting decreased appetite and making healthier food choices. She felt the tablet form was ineffective when she switched.    Patient continues to take Topamax 50mg daily for migraines. She reports a recent severe migraine episode at work that caused vomiting twice and dizziness. She took Zofran and Stanback for relief but states it was extremely severe. She believes it would have been better managed with Ubrelvy, which she previously found effective but is no longer covered by her insurance.    Patient reports chronic constipation, for which she takes Dulcolax once a week. She has tried Linzess in the past, prescribed by   Tavon, but found it ineffective. She also takes stool softeners daily without significant effect.    MEDICATIONS:  - Nexium for acid reflux, effective  - Topamax 50 mg, daily, for migraines  - Phentermine 15 mg, 2 capsules daily (30 mg total), for weight loss  - Zofran, as needed for nausea  - Dulcolax, once a week, for constipation  - Stool softeners, daily, for constipation (ineffective)  - Phentermine increased from 15 mg to 30 mg daily (patient doubled dose)  - Discontinued Ozempic  - Discontinued Wegovy  - Discontinued Ubrelvy for migraines    PMH:  - Weight management issues  - Acid reflux  - Overactive bladder  - Migraines  - Gastroparesis with constipation  - Chronic constipation    RECENT/REMOTE SURGICAL HISTORY:  - Weight loss surgery: 2018    SOCIAL HISTORY:  - Occupation: [employer]    Marital status:          [facility]    3/12/2025  9:54 AM CDT - Filed by Patient   This questionnaire should take approximately 5 to 10 minutes to complete.  To begin, press Let's Begin and then press Continue. Let's Begin   On average, how many days per week do you engage in moderate to strenuous exercise (like a brisk walk)? 2 days   On average, how many minutes do you engage in exercise at this level? 30 min   Do you feel stress - tense, restless, nervous, or anxious, or unable to sleep at night because your mind is troubled all the time - these days? To some extent   How often do you feel lonely or isolated from those around you? Rarely   How often do you need to have someone help you when you read instructions, pamphlets, or other written material from your doctor or pharmacy? Never   How hard is it for you to pay for the very basics like food, housing, medical care, and heating? Not hard at all   In the past 12 months has the electric, gas, oil, or water company threatened to shut off services in your home? No   Within the past 12 months, you worried that your food would run out before you got the money  "to buy more. Never true   Within the past 12 months, the food you bought just didn't last and you didn't have money to get more. Never true   In the past 12 months, has lack of transportation kept you from medical appointments or from getting medications? No   In the past 12 months, has lack of transportation kept you from meetings, work, or from getting things needed for daily living? No   In the last 12 months, was there a time when you were not able to pay the mortgage or rent on time? No   In the past 12 months, how many times have you moved where you were living? 0   At any time in the past 12 months, were you homeless or living in a shelter (including now)? No   How often do you have a drink containing alcohol? Never   How many drinks containing alcohol do you have on a typical day when you are drinking? Patient does not drink   How often do you have six or more drinks on one occasion? Never            Pmh, Psh, Family Hx, Social Hx, HM updated in Epic Tabs today.    Review of Systems   Constitutional:  Positive for appetite change. Negative for activity change, fatigue and unexpected weight change.   Respiratory:  Negative for cough and shortness of breath.    Cardiovascular:  Negative for chest pain and palpitations.   Gastrointestinal:  Positive for abdominal pain, constipation, diarrhea and nausea. Negative for abdominal distention.   Neurological:  Positive for headaches.   Psychiatric/Behavioral:  Negative for dysphoric mood and sleep disturbance. The patient is not nervous/anxious.         Objective:     Vitals:    04/23/25 0919   BP: 120/80   BP Location: Right arm   Patient Position: Sitting   Pulse: 86   Resp: 18   Temp: 97.1 °F (36.2 °C)   TempSrc: Tympanic   SpO2: 97%   Weight: 100.6 kg (221 lb 12.5 oz)   Height: 5' 10" (1.778 m)     Wt Readings from Last 10 Encounters:   04/23/25 100.6 kg (221 lb 12.5 oz)   03/19/25 102.6 kg (226 lb 3.1 oz)   01/08/25 94.8 kg (209 lb)   11/29/23 83.5 kg (184 lb) "   09/28/23 86.2 kg (190 lb)   09/11/23 88.9 kg (196 lb)   07/18/23 83.9 kg (185 lb)   07/06/23 83 kg (183 lb)   04/11/23 83 kg (183 lb)   01/18/23 78.9 kg (174 lb)     Physical Exam            Physical Exam  Vitals and nursing note reviewed.   Constitutional:       General: She is awake.      Appearance: Normal appearance. She is well-developed and well-groomed. She is obese.   HENT:      Head: Normocephalic and atraumatic.      Right Ear: Tympanic membrane and external ear normal.      Left Ear: Tympanic membrane and external ear normal.      Nose: Nose normal.   Eyes:      Conjunctiva/sclera: Conjunctivae normal.   Neck:      Thyroid: No thyromegaly or thyroid tenderness.   Cardiovascular:      Rate and Rhythm: Normal rate and regular rhythm.      Heart sounds: Normal heart sounds.   Pulmonary:      Effort: Pulmonary effort is normal. No accessory muscle usage.      Breath sounds: Normal breath sounds.   Musculoskeletal:      Cervical back: Normal range of motion and neck supple.   Neurological:      General: No focal deficit present.      Mental Status: She is alert. Mental status is at baseline.   Psychiatric:         Attention and Perception: Attention normal.         Mood and Affect: Mood normal.         Speech: Speech normal.         Behavior: Behavior normal. Behavior is cooperative.         Thought Content: Thought content normal.         Judgment: Judgment normal.         Assessment:   LABS:   Lab Results   Component Value Date    HGBA1C 4.9 12/27/2024    HGBA1C 4.7 09/10/2024      Lab Results   Component Value Date    CHOL 174 07/03/2023     Lab Results   Component Value Date    LDLCALC 74 07/03/2023    LDLCALC 7 07/03/2023     Lab Results   Component Value Date    WBC 5.4 12/04/2018    HGB 12.9 12/04/2018    HCT 39.3 12/04/2018     12/04/2018    CHOL 174 07/03/2023    TRIG 38 (A) 07/03/2023    HDL 75 (A) 07/03/2023    ALT 19 12/27/2021    AST 22 12/27/2021     03/30/2023    K 3.7 03/30/2023      12/04/2018    CREATININE 0.83 03/30/2023    BUN 17 03/30/2023    CO2 26 03/30/2023    TSH 0.43 09/10/2024    INR 1.0 12/04/2018    HGBA1C 4.9 12/27/2024       Plan:   1. Weight gain, abnormal  -     phentermine 15 MG capsule; Take 2 capsules (30 mg total) by mouth every morning.  Dispense: 60 capsule; Refill: 0  -     MYC E-VISIT    2. Bariatric surgery status  Overview:  Gastric sleeve in 2018.  Maximum weight 330 lb    Orders:  -     phentermine 15 MG capsule; Take 2 capsules (30 mg total) by mouth every morning.  Dispense: 60 capsule; Refill: 0  -     MYC E-VISIT    3. Gastroesophageal reflux disease, unspecified whether esophagitis present  -     PHARMACOGENOMICS PANEL; Future; Expected date: 04/23/2025  -     Consult to Pharmacogenomics    4. Gastroparesis    5. Migraine without aura and without status migrainosus, not intractable  -     PHARMACOGENOMICS PANEL; Future; Expected date: 04/23/2025  -     Consult to Pharmacogenomics  -     rimegepant (NURTEC) 75 mg odt; Take 1 tablet (75 mg total) by mouth once as needed for Migraine (max dose is 1 tablet in 24 hrs). Place ODT tablet on the tongue; alternatively the ODT tablet may be placed under the tongue  Dispense: 24 tablet; Refill: 3    6. Medication intolerance  -     PHARMACOGENOMICS PANEL; Future; Expected date: 04/23/2025  -     Consult to Pharmacogenomics  -     rimegepant (NURTEC) 75 mg odt; Take 1 tablet (75 mg total) by mouth once as needed for Migraine (max dose is 1 tablet in 24 hrs). Place ODT tablet on the tongue; alternatively the ODT tablet may be placed under the tongue  Dispense: 24 tablet; Refill: 3    7. Encounter for screening mammogram for breast cancer  -     Mammo Digital Screening Bilat w/ Darnell (XPD); Future; Expected date: 04/23/2025    8. Chronic idiopathic constipation  -     linaCLOtide (LINZESS) 290 mcg Cap capsule; Take 1 capsule (290 mcg total) by mouth before breakfast.  Dispense: 30 capsule; Refill: 11      Assessment  & Plan    R63.5 Weight gain, abnormal  Z98.84 Bariatric surgery status  K21.9 Gastroesophageal reflux disease, unspecified whether esophagitis present  K31.84 Gastroparesis  G43.009 Migraine without aura and without status migrainosus, not intractable  Z78.9 Medication intolerance  Z12.31 Encounter for screening mammogram for breast cancer  K59.04 Chronic idiopathic constipation    IMPRESSION:  Considered pharmacogenetic testing to optimize medication selection but deferred due to potential out-of-pocket costs.  Deferred lab work and additional testing until patient meets insurance deductible.  Discussed insurance coverage and deductible considerations for various medical services and procedures, including preventive care coverage for mammograms.    PLAN SUMMARY:  - Start Nurtec for migraine management, replacing Ubrelvy  - Continue Phentermine 15 mg capsules, 2 capsules daily for weight management  - Continue Nexium for acid reflux symptoms  - Start Linzess 290 mcg for chronic constipation  - Mammogram ordered  - Complete e-visit in 4 weeks for follow-up, including weight and BP report  - Follow up in 3 months for video visit    WEIGHT GAIN, ABNORMAL:  - Continue Phentermine 15 mg capsules, 2 capsules daily (30 mg total dose) for weight management.  - Complete e-visit in 4 weeks (around May 21st) for follow-up, including weight and BP report.    GASTROESOPHAGEAL REFLUX DISEASE, UNSPECIFIED WHETHER ESOPHAGITIS PRESENT:  - Continue Nexium for effective management of acid reflux symptoms.    MIGRAINE WITHOUT AURA AND WITHOUT STATUS MIGRAINOSUS, NOT INTRACTABLE:  - Start Nurtec for migraine management, replacing Ubrelvy (no longer covered by insurance).  - Take one tablet at migraine onset, maximum 1 tablet in 24 hours.    ENCOUNTER FOR SCREENING MAMMOGRAM FOR BREAST CANCER:  - Mammogram ordered.    CHRONIC IDIOPATHIC CONSTIPATION:  - Start Linzess 290 mcg as potential alternative to Dulcolax for chronic  constipation.  - Take daily or as needed, with timing determined by patient based on bowel movement patterns.    LIFESTYLE CHANGES:  - Follow up in 3 months for video visit.  - Contact office if any issues arise with new medications or if patient has questions about insurance coverage for procedures.           No image results found.    The 10-year ASCVD risk score (Chasity HIGGINS, et al., 2019) is: 0.4%    Values used to calculate the score:      Age: 44 years      Sex: Female      Is Non- : Yes      Diabetic: No      Tobacco smoker: No      Systolic Blood Pressure: 120 mmHg      Is BP treated: No      HDL Cholesterol: 75 mg/dL      Total Cholesterol: 174 mg/dL    Follow-up: Follow up in about 3 months (around 7/23/2025) for f/u VV Dr. Branch - 3 mo .    I spent a total of   40    minutes face to face and non-face to face on the date of this visit.This includes time preparing to see the patient (eg, review of tests, notes), obtaining and/or reviewing additional history from an independent historian and/or outside medical records, documenting clinical information in the electronic health record, independently interpreting results and/or communicating results to the patient/family/caregiver, or care coordinator.  Visit today included increased complexity associated with the care of the episodic problem addressed and managing the longitudinal care of the patient due to the serious and/or complex managed problem(s).    This note was generated with the assistance of ambient listening technology. Verbal consent was obtained by the patient and accompanying visitor(s) for the recording of patient appointment to facilitate this note. I attest to having reviewed and edited the generated note for accuracy, though some syntax or spelling errors may persist. Please contact the author of this note for any clarification.       Patient Instructions   You may want to contact [facility], here is the contact  information.     If you have specific questions about your statement, call [facility] Patient Account Customer Service Department Monday - Thursday from 7:30 a.m. to 6:00 p.m. and Friday from 8:00 a.m. and 4:30 p.m.    To contact us in writing:    [facility]  [address] [place] [id number]    To contact us by telephone:    Phone: [phone]  Toll Free: [phone]    To contact us by email: [email or web address]

## 2025-04-29 NOTE — PROGRESS NOTES
Patient ID: Laura Haddad is a 44 y.o. Black or  female    Subjective  Chief Complaint: patient presents for medical weight loss management.    Contraindications to GLP-1 receptor agonist therapy:   Denies personal or family history of MTC and personal history of MEN2     Pregnancy Status:   - Pt denies current pregnancy, breastfeeding, or plans to become pregnant.  - Pt denies current use of oral hormonal contraception. Hyterectoym    Co-morbidities: DLD    History of weight loss therapy: Pt previously used Adipex, Ozempic (for 9 months about 3 years ago) and Wegovy (for 1 year about 1 year ago). Pt discontinued therapy to insurance coverage (switched jobs) and is now slowly regaining weight back.      Weight loss history:  Starting weight:    4/24/2025   Recent Readings    Weight (lbs) 220.6 lb    BMI 31.65 BMI      Objective  Lab Results   Component Value Date     03/30/2023     09/03/2022     05/06/2022     Lab Results   Component Value Date    K 3.7 03/30/2023    K 3.9 09/03/2022    K 3.6 05/06/2022     Lab Results   Component Value Date     12/04/2018     Lab Results   Component Value Date    CO2 26 03/30/2023    CO2 26 09/03/2022    CO2 26 05/06/2022     Lab Results   Component Value Date    BUN 17 03/30/2023    BUN 12 09/03/2022    BUN 14 05/06/2022     Lab Results   Component Value Date    GLU 88 03/30/2023    GLU 80 09/03/2022    GLU 93 05/06/2022     Lab Results   Component Value Date    CALCIUM 8.7 03/30/2023    CALCIUM 8.4 09/03/2022    CALCIUM 8.8 05/06/2022     Lab Results   Component Value Date    PROT 7.3 12/04/2018     Lab Results   Component Value Date    ALBUMIN 4.0 12/27/2021    ALBUMIN 3.3 07/17/2021    ALBUMIN 3.7 05/11/2021     Lab Results   Component Value Date    BILITOT 0.3 12/04/2018     Lab Results   Component Value Date    AST 22 12/27/2021    AST 33 07/17/2021    AST 24 05/11/2021     Lab Results   Component Value Date    ALT 19 12/27/2021     ALT 33 07/17/2021    ALT 10 05/11/2021     Lab Results   Component Value Date    ANIONGAP 7 03/30/2023    ANIONGAP 11 09/03/2022    ANIONGAP 11 05/06/2022     Lab Results   Component Value Date    CREATININE 0.83 03/30/2023    CREATININE 0.9 09/03/2022    CREATININE 0.9 05/06/2022     Lab Results   Component Value Date    EGFRNORACEVR >60 03/30/2023    EGFRNORACEVR >60 09/03/2022    EGFRNORACEVR >60 05/06/2022     Assessment/Plan  -Pt qualifies for GLP-1 RA therapy based on BMI greater than or equal to 30 kg/m2  - Initiate Zepbound 2.5 mg SQ weekly x 4 weeks  - Then increase to Zepbound 5 mg SQ weekly  - RTC in 3 months for follow-up evaluation      Patient consented to pharmacist management via collaborative practice.

## 2025-04-30 ENCOUNTER — OFFICE VISIT (OUTPATIENT)
Dept: INTERNAL MEDICINE | Facility: CLINIC | Age: 45
End: 2025-04-30
Payer: COMMERCIAL

## 2025-04-30 ENCOUNTER — PATIENT MESSAGE (OUTPATIENT)
Dept: INTERNAL MEDICINE | Facility: CLINIC | Age: 45
End: 2025-04-30

## 2025-04-30 DIAGNOSIS — E66.811 OBESITY, CLASS I, BMI 30.0-34.9 (SEE ACTUAL BMI): Primary | ICD-10-CM

## 2025-04-30 DIAGNOSIS — E66.811 OBESITY, CLASS I, BMI 30-34.9: ICD-10-CM

## 2025-04-30 PROCEDURE — 99499 UNLISTED E&M SERVICE: CPT | Mod: 95,,,

## 2025-04-30 RX ORDER — TIRZEPATIDE 2.5 MG/.5ML
2.5 INJECTION, SOLUTION SUBCUTANEOUS
Qty: 2 ML | Refills: 0 | Status: ACTIVE | OUTPATIENT
Start: 2025-04-30

## 2025-04-30 RX ORDER — TIRZEPATIDE 5 MG/.5ML
5 INJECTION, SOLUTION SUBCUTANEOUS
Qty: 2 ML | Refills: 2 | Status: ACTIVE | OUTPATIENT
Start: 2025-04-30

## 2025-05-01 PROBLEM — E66.811 OBESITY, CLASS I, BMI 30-34.9: Status: ACTIVE | Noted: 2025-04-30

## 2025-05-05 ENCOUNTER — PATIENT OUTREACH (OUTPATIENT)
Dept: ADMINISTRATIVE | Facility: HOSPITAL | Age: 45
End: 2025-05-05
Payer: COMMERCIAL

## 2025-05-05 NOTE — PROGRESS NOTES
Working mammogram report:    Chart searched  Attempted to contact pt regarding overdue mammogram  Declines at this time

## 2025-05-21 ENCOUNTER — E-VISIT (OUTPATIENT)
Dept: PRIMARY CARE CLINIC | Facility: CLINIC | Age: 45
End: 2025-05-21
Payer: COMMERCIAL

## 2025-05-21 DIAGNOSIS — E66.09 CLASS 1 OBESITY DUE TO EXCESS CALORIES WITH BODY MASS INDEX (BMI) OF 32.0 TO 32.9 IN ADULT, UNSPECIFIED WHETHER SERIOUS COMORBIDITY PRESENT: ICD-10-CM

## 2025-05-21 DIAGNOSIS — R63.5 WEIGHT GAIN, ABNORMAL: Primary | ICD-10-CM

## 2025-05-21 DIAGNOSIS — E66.811 CLASS 1 OBESITY DUE TO EXCESS CALORIES WITH BODY MASS INDEX (BMI) OF 32.0 TO 32.9 IN ADULT, UNSPECIFIED WHETHER SERIOUS COMORBIDITY PRESENT: ICD-10-CM

## 2025-05-21 DIAGNOSIS — Z98.84 BARIATRIC SURGERY STATUS: ICD-10-CM

## 2025-05-21 PROCEDURE — 99421 OL DIG E/M SVC 5-10 MIN: CPT | Mod: NDTC,,, | Performed by: FAMILY MEDICINE

## 2025-05-26 NOTE — PROGRESS NOTES
Patient ID: Laura Haddad is a 44 y.o. female.    Chief Complaint: Weight Loss (Entered automatically based on patient selection in [facility].)    The patient initiated a request through [facility] on 5/21/2025 for evaluation and management with a chief complaint of Weight Loss (Entered automatically based on patient selection in [facility].)     I evaluated the questionnaire submission on 05/26/2025.    [facility] Evisit Weight Management    5/21/2025 10:13 PM CDT - Filed by Patient   Do you agree to participate in an E-Visit? Yes   If you have any of the following symptoms, please present to your local emergency room or call 911: I acknowledge   Medication requests for narcotics will not be addressed via an E-Visit.  Please schedule an appointment. I acknowledge   Choose the state of your primary residence [place]   Do you have any of the following pregnancy-related conditions? None   What best describes your appetite? Average   Do you have specific food cravings? Yes   Describe your specific food cravings: Sweets   When you eat, how quickly do you feel full No change   Give an example of what you have eaten in a day in the past 2 weeks:    Breakfast: Greek yogurt   Lunch: Shrimp, spinach leaves   Dinner: Baked fish, brown rice, broccoli   Snacks: Grapes   Drinks: Water, crystal light   Have you exercised in the past week? Yes   What type of exercise? Walking, treadmill   How many days in a week do you exercise? 4   How many minutes do you exericse? 30-60   Do you have a personal or family history of thyroid cancer? No   Do you have a personal history of kidney stones? No   Do you have a personal history of seizures? No   Do you have a personal history of pancreatitis? No   I would like to address: Medication for weight loss   Do you want to address a new or existing medication? I would like to address a medication I currently take   Would you like to change or continue your medication? Continue medication   What  medication would you like to continue?  Zepbound   Are you taking it as prescribed? Yes    What medical condition is the  medication intended to treat? Weight loss   Is the medication helping your condition? I don't know   Are you having any side effects from the medication? No   Provide any additional information you feel is important.    Please attach any relevant images or files    Are you able to take your vital signs? Yes   Systolic Blood Pressure: 118   Diastolic Blood Pressure: 72   Weight: 219.6   Height: 60   Pulse: 68   Temperature: 97.9   Respiration rate: 16   Pulse Oxygen:          Active Problem List with Overview Notes    Diagnosis Date Noted    Obesity, Class I, BMI 30-34.9 04/30/2025    Herniation of lumbar intervertebral disc with radiculopathy 03/19/2025    Hip pain 03/19/2025    Sacroiliac joint pain 03/19/2025    Weight gain, abnormal 03/19/2025    Nausea 03/19/2025    BMI 30.0-30.9,adult 12/27/2024    Gastroesophageal reflux disease 12/27/2024    Migraine syndrome 12/27/2024    History of vitamin D deficiency 09/29/2024     Vit D level is ~52 in 9/2024      Insomnia 09/11/2024    Bariatric surgery status 09/11/2023     Gastric sleeve in 2018.  Maximum weight 330 lb      Constipation 09/11/2023    Mixed hyperlipidemia 09/11/2023    Anxiety and depression 09/11/2023    History of obesity 09/11/2023     There are multiple etiologies of weight gain. A  weight loss plan that focuses on diet, exercise and good healthy lifestyle changes are a necessity to help combat obesity. Medication and/orsurgical options are available;  unfortunately,  many options may not be approved by insurance nor FDA approved for obesity but could be very beneficial. There are possible risks associated with therapeutic options and patient should notify us of any concerns. Patients should adhere to plan and follow up routinely as directed.       Subclinical hyperthyroidism 09/11/2023    Vitamin D deficiency 09/19/2022     Thyroid nodule 09/19/2022    Lumbar disc disease 06/01/2018    Migraine without aura and without status migrainosus, not intractable 01/08/2018    Fibromyalgia 01/31/2014      Recent Labs Obtained:  No visits with results within 7 Day(s) from this visit.   Latest known visit with results is:   Lab Visit on 01/08/2025   Component Date Value Ref Range Status    HIV 1/2 Ag/Ab 01/08/2025 Nonreactive  Non-reactive Final    RPR 01/08/2025 Non-reactive  Non-reactive Final       Encounter Diagnoses   Name Primary?    Weight gain, abnormal Yes    Bariatric surgery status     Class 1 obesity due to excess calories with body mass index (BMI) of 32.0 to 32.9 in adult, unspecified whether serious comorbidity present         No orders of the defined types were placed in this encounter.         Laura was seen today for weight loss.    Diagnoses and all orders for this visit:    Weight gain, abnormal    Bariatric surgery status    Class 1 obesity due to excess calories with body mass index (BMI) of 32.0 to 32.9 in adult, unspecified whether serious comorbidity present       No follow-ups on file.      E-Visit Time Tracking:    Day 1 Time (in minutes): 5    Total Time (in minutes): 5            There are no Patient Instructions on file for this visit.

## 2025-06-09 ENCOUNTER — PATIENT MESSAGE (OUTPATIENT)
Dept: HEMATOLOGY/ONCOLOGY | Facility: CLINIC | Age: 45
End: 2025-06-09
Payer: COMMERCIAL

## 2025-06-12 ENCOUNTER — OFFICE VISIT (OUTPATIENT)
Dept: PRIMARY CARE CLINIC | Facility: CLINIC | Age: 45
End: 2025-06-12
Payer: COMMERCIAL

## 2025-06-12 ENCOUNTER — PATIENT MESSAGE (OUTPATIENT)
Dept: PRIMARY CARE CLINIC | Facility: CLINIC | Age: 45
End: 2025-06-12

## 2025-06-12 VITALS — BODY MASS INDEX: 31.07 KG/M2 | WEIGHT: 217 LBS | HEIGHT: 70 IN

## 2025-06-12 DIAGNOSIS — E66.811 CLASS 1 OBESITY DUE TO EXCESS CALORIES WITH BODY MASS INDEX (BMI) OF 32.0 TO 32.9 IN ADULT, UNSPECIFIED WHETHER SERIOUS COMORBIDITY PRESENT: ICD-10-CM

## 2025-06-12 DIAGNOSIS — N32.81 OAB (OVERACTIVE BLADDER): ICD-10-CM

## 2025-06-12 DIAGNOSIS — E66.09 CLASS 1 OBESITY DUE TO EXCESS CALORIES WITH BODY MASS INDEX (BMI) OF 32.0 TO 32.9 IN ADULT, UNSPECIFIED WHETHER SERIOUS COMORBIDITY PRESENT: ICD-10-CM

## 2025-06-12 DIAGNOSIS — F43.23 ADJUSTMENT DISORDER WITH MIXED ANXIETY AND DEPRESSED MOOD: Primary | ICD-10-CM

## 2025-06-12 RX ORDER — BUPROPION HYDROCHLORIDE 150 MG/1
150 TABLET ORAL DAILY
Qty: 90 TABLET | Refills: 3 | Status: SHIPPED | OUTPATIENT
Start: 2025-06-12

## 2025-06-12 RX ORDER — MIRABEGRON 25 MG/1
50 TABLET, FILM COATED, EXTENDED RELEASE ORAL DAILY
COMMUNITY
Start: 2025-05-19 | End: 2025-06-12

## 2025-06-12 RX ORDER — MIRABEGRON 50 MG/1
50 TABLET, FILM COATED, EXTENDED RELEASE ORAL DAILY
Qty: 90 TABLET | Refills: 3 | Status: SHIPPED | OUTPATIENT
Start: 2025-06-12

## 2025-06-12 NOTE — PROGRESS NOTES
Chief Complaint  Chief Complaint   Patient presents with    Medication Refill       HPI  Laura Haddad is a 44 y.o. female with multiple medical diagnoses as listed in the medical history and problem list that presents for  virtual visit.       History of Present Illness    CHIEF COMPLAINT:  - Patient presents for a follow-up visit to discuss restarting Wellbutrin and refilling her overactive bladder medication.    HPI:  Patient, a 44-year-old female, reports increased stress in her marriage and at work, with increased frequency of crying. She expresses a desire to restart Wellbutrin to help regulate her emotions. She has previously taken Wellbutrin XL 300mg daily and immediate-release Wellbutrin 100mg twice daily, with the immediate-release version last taken from September 2023 to November 2024.    She reports ongoing issues with overactive bladder. Myrbetriq 25mg daily was ineffective, but taking it twice daily provided better symptom relief.    Patient is enrolled in a digital weight loss program and is taking Zepbound. Initially, she did not notice significant changes in her eating habits, but now reports weight changes. This morning, she weighed 217.8 lbs, a loss of about 5 lbs since her last visit.    MEDICATIONS:  - Zepbound for weight loss, effective (patient reports weight loss)  - Topamax  - Myrbetriq 25 mg, twice daily, for overactive bladder, not fully effective at daily dosing  - Discontinued Wellbutrin  mg in the past  - Discontinued immediate release Wellbutrin 100 mg twice daily in November 2024  - Discontinued Intermittin 15 mg capsule for weight loss in the past    PMH:  - Overactive bladder  - Adjustment disorder with mixed anxiety and depressed mood    SOCIAL HISTORY:  - Occupation: Employed    Marital status:          Pmh, Psh, Family Hx, Social Hx updated in Epic Tabs today.     Review of Systems   Constitutional:  Positive for activity change and appetite change. Negative for  unexpected weight change.   HENT:  Negative for hearing loss, rhinorrhea and trouble swallowing.    Eyes:  Negative for discharge and visual disturbance.   Respiratory:  Negative for chest tightness and wheezing.    Cardiovascular:  Negative for chest pain and palpitations.   Gastrointestinal:  Negative for blood in stool, constipation, diarrhea and vomiting.   Endocrine: Negative for polydipsia and polyuria.   Genitourinary:  Negative for decreased urine volume, difficulty urinating, dysuria, hematuria, menstrual problem and urgency.   Musculoskeletal:  Negative for arthralgias, joint swelling and neck pain.   Neurological:  Negative for weakness and headaches.   Psychiatric/Behavioral:  Positive for dysphoric mood and sleep disturbance. Negative for confusion. The patient is nervous/anxious.            Physical Exam  Vitals reviewed.   Constitutional:       General: She is awake. She is not in acute distress.     Appearance: Normal appearance. She is well-developed and well-groomed. She is obese. She is not ill-appearing.   HENT:      Head: Normocephalic and atraumatic.      Right Ear: External ear normal.      Left Ear: External ear normal.      Nose: Nose normal.      Mouth/Throat:      Lips: Pink.   Eyes:      Conjunctiva/sclera: Conjunctivae normal.   Pulmonary:      Effort: Pulmonary effort is normal.   Neurological:      Mental Status: She is alert.   Psychiatric:         Attention and Perception: Attention and perception normal. She is attentive.         Mood and Affect: Mood normal. Mood is not anxious or depressed. Affect is tearful. Affect is not labile, blunt, angry or inappropriate.         Speech: Speech normal. She is communicative. Speech is not rapid and pressured, delayed, slurred or tangential.         Behavior: Behavior normal. Behavior is not agitated, slowed, aggressive, withdrawn, hyperactive or combative. Behavior is cooperative.         Thought Content: Thought content normal. Thought  content is not paranoid or delusional. Thought content does not include homicidal or suicidal ideation. Thought content does not include homicidal or suicidal plan.         Cognition and Memory: Cognition and memory normal. Memory is not impaired. She does not exhibit impaired recent memory or impaired remote memory.         Judgment: Judgment normal. Judgment is not impulsive or inappropriate.       Physical Exam    Vitals: Weight: 217.8 lbs.           ASSESSMENT/PLAN:  1. Adjustment disorder with mixed anxiety and depressed mood  -     buPROPion (WELLBUTRIN XL) 150 MG TB24 tablet; Take 1 tablet (150 mg total) by mouth once daily.  Dispense: 90 tablet; Refill: 3    2. OAB (overactive bladder)  -     mirabegron (MYRBETRIQ) 50 mg Tb24; Take 1 tablet (50 mg total) by mouth once daily.  Dispense: 90 tablet; Refill: 3    3. Class 1 obesity due to excess calories with body mass index (BMI) of 32.0 to 32.9 in adult, unspecified whether serious comorbidity present      Assessment & Plan    F43.23 Adjustment disorder with mixed anxiety and depressed mood  N32.81 OAB (overactive bladder)  E66.811, E66.09, Z68.32 Class 1 obesity due to excess calories with body mass index (BMI) of 32.0 to 32.9 in adult, unspecified whether serious comorbidity present    IMPRESSION:  Restarted Wellbutrin  mg daily for adjustment disorder with mixed anxiety and depressed mood.  Increased Myrbetriq from 25 mg to 50 mg daily for overactive bladder due to improved efficacy at higher dose.  Noted participation in [facility] digital weight loss program using Zepbound, which has resulted in 4-5 pound weight loss; continued Zepbound for weight management.  Continued Topamax as part of weight management regimen.  Discontinued Intermittin (previously on 15 mg).    PLAN SUMMARY:  - Restarted Wellbutrin  mg daily  - Increased Myrbetriq to 50 mg daily  - Continued Zepbound and Topamax for weight management  - Discontinued Intermittin    ADJUSTMENT  DISORDER WITH MIXED ANXIETY AND DEPRESSED MOOD:  - Explained extended-release formulation maintains consistent levels of serotonin, norepinephrine, and dopamine without fluctuations associated with immediate-release formulations; discussed potential benefits for mood, anxiety, and weight management.  - Restarted Wellbutrin  mg daily.    OAB (OVERACTIVE BLADDER):  - Increased Myrbetriq from 25 mg to 50 mg daily.    CLASS 1 OBESITY DUE TO EXCESS CALORIES WITH BODY MASS INDEX (BMI) OF 32.0 TO 32.9 IN ADULT, UNSPECIFIED WHETHER SERIOUS COMORBIDITY PRESENT:  - Continued Zepbound and Topamax as part of weight management regimen.  - Discontinued Intermittin (previously on 15 mg).       No image results found.    Lab Results   Component Value Date    WBC 5.4 12/04/2018    HGB 12.9 12/04/2018    HCT 39.3 12/04/2018     12/04/2018    CHOL 174 07/03/2023    TRIG 38 (A) 07/03/2023    HDL 75 (A) 07/03/2023    ALT 19 12/27/2021    AST 22 12/27/2021     03/30/2023    K 3.7 03/30/2023     12/04/2018    CREATININE 0.83 03/30/2023    BUN 17 03/30/2023    CO2 26 03/30/2023    TSH 0.43 09/10/2024    INR 1.0 12/04/2018    HGBA1C 4.9 12/27/2024       Follow-up: Follow up if symptoms worsen or fail to improve.    ______________________________________________________________________    Face to Face time with patient: 11:40 AM CDT - 1203pm     The patient location is:  home  The chief complaint leading to consultation is: noted   Visit type: Virtual visit with synchronous audio and video   Each patient to whom he or she provides medical services by telemedicine is:  (1) informed of the relationship between the physician and patient and the respective role of any other health care provider with respect to management of the patient; and (2) notified that he or she may decline to receive medical services by telemedicine and may withdraw from such care at any time.    I spent a total of  30     minutes face to face and  non-face to face on the date of this visit.This includes time preparing to see the patient (eg, review of tests, notes), obtaining and/or reviewing additional history from an independent historian and/or outside medical records, documenting clinical information in the electronic health record, independently interpreting results and/or communicating results to the patient/family/caregiver, or care coordinator.  Visit today included increased complexity associated with the care of the episodic problem addressed and managing the longitudinal care of the patient due to the serious and/or complex managed problem(s).    This note was generated with the assistance of ambient listening technology. Verbal consent was obtained by the patient and accompanying visitor(s) for the recording of patient appointment to facilitate this note. I attest to having reviewed and edited the generated note for accuracy, though some syntax or spelling errors may persist. Please contact the author of this note for any clarification.

## 2025-06-19 ENCOUNTER — PATIENT MESSAGE (OUTPATIENT)
Dept: ADMINISTRATIVE | Facility: OTHER | Age: 45
End: 2025-06-19
Payer: COMMERCIAL

## 2025-06-30 ENCOUNTER — PATIENT MESSAGE (OUTPATIENT)
Dept: PRIMARY CARE CLINIC | Facility: CLINIC | Age: 45
End: 2025-06-30
Payer: COMMERCIAL

## 2025-07-07 ENCOUNTER — OFFICE VISIT (OUTPATIENT)
Dept: PRIMARY CARE CLINIC | Facility: CLINIC | Age: 45
End: 2025-07-07
Payer: COMMERCIAL

## 2025-07-07 DIAGNOSIS — F43.23 ADJUSTMENT DISORDER WITH MIXED ANXIETY AND DEPRESSED MOOD: ICD-10-CM

## 2025-07-07 DIAGNOSIS — B37.9 ANTIBIOTIC-INDUCED YEAST INFECTION: ICD-10-CM

## 2025-07-07 DIAGNOSIS — J34.0 CELLULITIS OF SIDEWALL OF NOSE: Primary | ICD-10-CM

## 2025-07-07 DIAGNOSIS — T36.95XA ANTIBIOTIC-INDUCED YEAST INFECTION: ICD-10-CM

## 2025-07-07 PROCEDURE — G2211 COMPLEX E/M VISIT ADD ON: HCPCS | Mod: 95,,, | Performed by: FAMILY MEDICINE

## 2025-07-07 PROCEDURE — 98006 SYNCH AUDIO-VIDEO EST MOD 30: CPT | Mod: 95,,, | Performed by: FAMILY MEDICINE

## 2025-07-07 PROCEDURE — 1159F MED LIST DOCD IN RCRD: CPT | Mod: CPTII,95,, | Performed by: FAMILY MEDICINE

## 2025-07-07 PROCEDURE — 1160F RVW MEDS BY RX/DR IN RCRD: CPT | Mod: CPTII,95,, | Performed by: FAMILY MEDICINE

## 2025-07-07 RX ORDER — FLUCONAZOLE 150 MG/1
150 TABLET ORAL
Qty: 2 TABLET | Refills: 0 | Status: SHIPPED | OUTPATIENT
Start: 2025-07-07

## 2025-07-07 RX ORDER — BUPROPION HYDROCHLORIDE 100 MG/1
100 TABLET ORAL 2 TIMES DAILY
Qty: 60 TABLET | Refills: 11 | Status: SHIPPED | OUTPATIENT
Start: 2025-07-07 | End: 2026-07-07

## 2025-07-07 RX ORDER — MUPIROCIN 20 MG/G
OINTMENT TOPICAL 4 TIMES DAILY
Qty: 30 G | Refills: 0 | Status: SHIPPED | OUTPATIENT
Start: 2025-07-07

## 2025-07-07 RX ORDER — CLINDAMYCIN HYDROCHLORIDE 300 MG/1
300 CAPSULE ORAL EVERY 6 HOURS
Qty: 28 CAPSULE | Refills: 0 | Status: SHIPPED | OUTPATIENT
Start: 2025-07-07 | End: 2025-07-14

## 2025-07-07 NOTE — PROGRESS NOTES
Chief Complaint  Chief Complaint   Patient presents with    skin issue       HPI  Laura Haddad is a 44 y.o. female with multiple medical diagnoses as listed in the medical history and problem list that presents for  virtual visit.       History of Present Illness    CHIEF COMPLAINT:  - Patient presents with concerns about a potentially infected nose piercing.    HPI:  Patient is a 44-year-old female with a nose piercing for approximately 1 month. She reports redness and swelling around the piercing site. She used a nasal wash about 12 times in 1 day, which slightly reduced the swelling. The area remains red and somewhat swollen. The onset of these symptoms coincided with her daughter and grandbaby shaheen hand, foot, and mouth disease while on vacation. Although she was in close contact with them, she did not develop any symptoms of hand, foot, and mouth disease herself. She is concerned about the need for antibiotics but is reluctant to remove the nose ring. She states that the piercing is made of victor m silver and that the symptoms did not start until her family members became ill. She is scheduled to return to work after what seemed like a 3.5-week absence.    She denies developing symptoms of hand, foot, and mouth disease despite close contact with infected family members.    MEDICATIONS:  - Wellbutrin SR  - Wellbutrin: Patient requests to switch from SR (extended-release) formulation to immediate release formulation, 100 mg twice daily (which she was on before)    PMH:  - Hand, foot, and mouth disease: Possible exposure but did not develop symptoms    FAMILY HISTORY:  - Daughter: Severe case of hand, foot, and mouth disease  - Grandchild: Hand, foot, and mouth disease    ALLERGIES:  - Bactrim: rash    SOCIAL HISTORY:  - Occupation: Employed    Marital status:  for 25 years         Pmh, Psh, Family Hx, Social Hx updated in Epic Tabs today.     Review of Systems   Constitutional:  Negative for  activity change.   HENT:  Negative for hearing loss, rhinorrhea and trouble swallowing.    Eyes:  Negative for discharge and visual disturbance.   Respiratory:  Negative for chest tightness and wheezing.    Cardiovascular:  Negative for chest pain and palpitations.   Gastrointestinal:  Negative for blood in stool, constipation, diarrhea and vomiting.   Endocrine: Negative for polydipsia and polyuria.   Genitourinary:  Negative for difficulty urinating, dysuria, hematuria and menstrual problem.   Musculoskeletal:  Negative for arthralgias and joint swelling.   Skin:  Positive for color change and rash.   Neurological:  Negative for weakness and headaches.   Psychiatric/Behavioral:  Positive for dysphoric mood. Negative for confusion. The patient is not nervous/anxious.            Physical Exam  Vitals reviewed.   Constitutional:       General: She is awake. She is not in acute distress.     Appearance: Normal appearance. She is well-developed, well-groomed and normal weight. She is not ill-appearing.   HENT:      Head: Normocephalic and atraumatic.      Right Ear: External ear normal.      Left Ear: External ear normal.      Nose: Nasal tenderness present.        Mouth/Throat:      Lips: Pink.   Eyes:      Conjunctiva/sclera: Conjunctivae normal.   Pulmonary:      Effort: Pulmonary effort is normal.   Skin:     Findings: Erythema, rash and wound present.             Comments: On left nose, where piercing is noted    Neurological:      Mental Status: She is alert.   Psychiatric:         Attention and Perception: Attention and perception normal. She is attentive.         Mood and Affect: Mood and affect normal. Mood is not anxious or depressed. Affect is not labile, blunt, angry or inappropriate.         Speech: Speech normal. She is communicative. Speech is not rapid and pressured, delayed, slurred or tangential.         Behavior: Behavior normal. Behavior is not agitated, slowed, aggressive, withdrawn, hyperactive or  combative. Behavior is cooperative.         Thought Content: Thought content normal. Thought content is not paranoid or delusional. Thought content does not include homicidal or suicidal ideation. Thought content does not include homicidal or suicidal plan.         Cognition and Memory: Cognition and memory normal. Memory is not impaired. She does not exhibit impaired recent memory or impaired remote memory.         Judgment: Judgment normal. Judgment is not impulsive or inappropriate.       Physical Exam    Nose: Erythema around nose piercing.           ASSESSMENT/PLAN:  1. Cellulitis of sidewall of nose  -     mupirocin (BACTROBAN) 2 % ointment; Apply topically 4 (four) times daily.  Dispense: 30 g; Refill: 0  -     clindamycin (CLEOCIN) 300 MG capsule; Take 1 capsule (300 mg total) by mouth every 6 (six) hours. for 7 days  Dispense: 28 capsule; Refill: 0    2. Antibiotic-induced yeast infection  -     fluconazole (DIFLUCAN) 150 MG Tab; Take 1 tablet (150 mg total) by mouth every 72 hours.  Dispense: 2 tablet; Refill: 0    3. Adjustment disorder with mixed anxiety and depressed mood  -     buPROPion (WELLBUTRIN) 100 MG tablet; Take 1 tablet (100 mg total) by mouth 2 (two) times daily.  Dispense: 60 tablet; Refill: 11      Assessment & Plan    J34.0 Cellulitis of sidewall of nose  B37.9, T36.95XA Antibiotic-induced yeast infection  F43.23 Adjustment disorder with mixed anxiety and depressed mood    IMPRESSION:  Assessed infected nose piercing, noting redness and swelling around the site.  Infection likely bacterial, not related to hand, foot, and mouth disease exposure.  Recommend removal of nose piercing to allow healing, citing risks of spread in facial triangle area.  Considered potential for metal allergy or sensitivity as contributing factor.  Opted for topical and oral antibiotics due to infection severity and location.  Advised cool compresses to reduce swelling without risking ischemia.  Discussed need for  potential escalation to IV antibiotics if condition worsens.    PLAN SUMMARY:  - Started Bactroban ointment for infected area and inside nose, 4 times daily  - Prescribed clindamycin 300 mg capsule, 1 capsule every 6 hours for 7 days  - Started Diflucan as directed  - Restarted Wellbutrin 100 mg immediate release, twice daily  - Follow up in 48 hours if infection not improving  - Go to emergency room for potential IV antibiotics if condition worsens  - Follow up with ENT if infection persists after nose piercing removal    CELLULITIS OF SIDEWALL OF NOSE:  - Explained risks associated with infections in the facial triangle area.  - Patient to apply cool compresses to infected area and take daily pictures to monitor improvement.  - Started Bactroban (mupirocin) ointment to be applied to infected area and inside nose 4 times daily, along with clindamycin 300 mg capsule, 1 capsule every 6 hours for 7 days.  - Follow up in 48 hours if infection not improving after starting treatment; go to the emergency room for potential IV antibiotics.  - If nose piercing is removed and infection still not improving after 1 day, follow up with ENT or go to emergency room.    ANTIBIOTIC-INDUCED YEAST INFECTION:  - Informed patient about the possibility of antibiotic-induced yeast infections and importance of gut adarsh.  - Started Diflucan as directed and recommended probiotics to maintain gut health while on antibiotics.    ADJUSTMENT DISORDER WITH MIXED ANXIETY AND DEPRESSED MOOD:  - Restarted Wellbutrin 100 mg immediate release, to be taken twice daily (switched from extended-release formulation).       No image results found.    Lab Results   Component Value Date    WBC 5.4 12/04/2018    HGB 12.9 12/04/2018    HCT 39.3 12/04/2018     12/04/2018    CHOL 174 07/03/2023    TRIG 38 (A) 07/03/2023    HDL 75 (A) 07/03/2023    ALT 19 12/27/2021    AST 22 12/27/2021     03/30/2023    K 3.7 03/30/2023     12/04/2018     CREATININE 0.83 03/30/2023    BUN 17 03/30/2023    CO2 26 03/30/2023    TSH 0.43 09/10/2024    INR 1.0 12/04/2018    HGBA1C 4.9 12/27/2024       Follow-up: No follow-ups on file.    ______________________________________________________________________    Face to Face time with patient:  7:00 AM CDT - 718am     The patient location is:  home  The chief complaint leading to consultation is: noted   Visit type: Virtual visit with synchronous audio and video   Each patient to whom he or she provides medical services by telemedicine is:  (1) informed of the relationship between the physician and patient and the respective role of any other health care provider with respect to management of the patient; and (2) notified that he or she may decline to receive medical services by telemedicine and may withdraw from such care at any time.    I spent a total of    30   minutes face to face and non-face to face on the date of this visit.This includes time preparing to see the patient (eg, review of tests, notes), obtaining and/or reviewing additional history from an independent historian and/or outside medical records, documenting clinical information in the electronic health record, independently interpreting results and/or communicating results to the patient/family/caregiver, or care coordinator.  Visit today included increased complexity associated with the care of the episodic problem addressed and managing the longitudinal care of the patient due to the serious and/or complex managed problem(s).    This note was generated with the assistance of ambient listening technology. Verbal consent was obtained by the patient and accompanying visitor(s) for the recording of patient appointment to facilitate this note. I attest to having reviewed and edited the generated note for accuracy, though some syntax or spelling errors may persist. Please contact the author of this note for any clarification.

## 2025-07-14 ENCOUNTER — PATIENT MESSAGE (OUTPATIENT)
Dept: PRIMARY CARE CLINIC | Facility: CLINIC | Age: 45
End: 2025-07-14
Payer: COMMERCIAL

## 2025-07-15 ENCOUNTER — OFFICE VISIT (OUTPATIENT)
Dept: GASTROENTEROLOGY | Facility: CLINIC | Age: 45
End: 2025-07-15
Payer: COMMERCIAL

## 2025-07-15 VITALS
SYSTOLIC BLOOD PRESSURE: 125 MMHG | HEIGHT: 70 IN | BODY MASS INDEX: 29.89 KG/M2 | HEART RATE: 97 BPM | WEIGHT: 208.75 LBS | DIASTOLIC BLOOD PRESSURE: 85 MMHG

## 2025-07-15 DIAGNOSIS — K59.04 CHRONIC IDIOPATHIC CONSTIPATION: ICD-10-CM

## 2025-07-15 DIAGNOSIS — K31.84 GASTROPARESIS: ICD-10-CM

## 2025-07-15 DIAGNOSIS — Z12.11 ENCOUNTER FOR SCREENING COLONOSCOPY: ICD-10-CM

## 2025-07-15 DIAGNOSIS — K21.9 GASTROESOPHAGEAL REFLUX DISEASE, UNSPECIFIED WHETHER ESOPHAGITIS PRESENT: Primary | ICD-10-CM

## 2025-07-15 DIAGNOSIS — Z98.84 BARIATRIC SURGERY STATUS: ICD-10-CM

## 2025-07-15 PROCEDURE — 99999 PR PBB SHADOW E&M-EST. PATIENT-LVL V: CPT | Mod: PBBFAC,,, | Performed by: INTERNAL MEDICINE

## 2025-07-15 PROCEDURE — 3079F DIAST BP 80-89 MM HG: CPT | Mod: CPTII,S$GLB,, | Performed by: INTERNAL MEDICINE

## 2025-07-15 PROCEDURE — 3008F BODY MASS INDEX DOCD: CPT | Mod: CPTII,S$GLB,, | Performed by: INTERNAL MEDICINE

## 2025-07-15 PROCEDURE — 1159F MED LIST DOCD IN RCRD: CPT | Mod: CPTII,S$GLB,, | Performed by: INTERNAL MEDICINE

## 2025-07-15 PROCEDURE — 99204 OFFICE O/P NEW MOD 45 MIN: CPT | Mod: S$GLB,,, | Performed by: INTERNAL MEDICINE

## 2025-07-15 PROCEDURE — 3074F SYST BP LT 130 MM HG: CPT | Mod: CPTII,S$GLB,, | Performed by: INTERNAL MEDICINE

## 2025-07-15 RX ORDER — SODIUM, POTASSIUM,MAG SULFATES 17.5-3.13G
1 SOLUTION, RECONSTITUTED, ORAL ORAL DAILY
Qty: 354 ML | Refills: 0 | Status: SHIPPED | OUTPATIENT
Start: 2025-07-15 | End: 2025-07-17

## 2025-07-15 RX ORDER — PLECANATIDE 3 MG/1
3 TABLET ORAL DAILY
Qty: 30 TABLET | Refills: 3 | Status: SHIPPED | OUTPATIENT
Start: 2025-07-15 | End: 2025-11-12

## 2025-07-15 NOTE — PROGRESS NOTES
[facility]  Gastroenterology    Patient evaluated at the request of Deborah Branch MD  [id number] [place] [address]    PCP: Deborah Branch MD    7/15/25    HPI       rectal spasms     Additional comments: Feels like contractions in the rectum (intensity is picking up and more often)          Last edited by Melanie Ledesma MA on 7/15/2025  2:38 PM.        Reason for Visit: Establish Care, GERD, Constipation, Rectal Spasms    Subjective:   Laura Haddad is a 44 y.o. female here for evaluation of GERD, constipation and rectal spasms. Used to follow with Dr. Montes De Oca at [facility] for a long time. Has history of gastric sleeve and GERD. Had reflux even before the surgery. Is on Nexium. Was told she had hiatal hernia and later that she did not. Also was told she had gastroparesis and had a scan done. Has had multiple EGDs but no prior colonoscopy. Also has chronic constipation since childhood. Tried Linzess but it gave her cramping and gas without a BM. Thinks she tried Amitiza a long time ago. She currently just takes Dulcolax once a week and has a BM only with that. Been dealing with rectal spasms that can last 20-30 minutes. Has been to pelvic floor PT in the past for bladder issues. Also has history of rectal prolapse.       Past Medical History:   Diagnosis Date    Classical migraine     Fibromyalgia     Herpes simplex 2013    Insulin resistance     Mixed hyperlipidemia 9/11/2023    Overactive bladder     Seizure disorder     Subclinical hyperthyroidism 9/11/2023       Past Surgical History:   Procedure Laterality Date    ABDOMINOPLASTY      ADENOIDECTOMY  1981    ANTERIOR VAGINAL REPAIR  2006    arm lift  Bilateral     AUGMENTATION OF BREAST      BREAST SURGERY  2009, 2020    BUTTOCK LIFT      CARPAL TUNNEL RELEASE  04/04/2023    CHOLECYSTECTOMY  2011    COSMETIC SURGERY  2020, 2021    CYSTOSCOPY  09/15/2022    Dr. Monteiro    FOOT SURGERY      HAND SURGERY  04/2023     HYSTERECTOMY  2001    KNEE ARTHROSCOPY      LAPAROSCOPIC SLEEVE GASTRECTOMY  12/10/2018    LIPOSUCTION      REDUCTION OF BOTH BREASTS  2009    TONSILLECTOMY  1988    TVH  2001    uterine prolapse    TYMPANOSTOMY TUBE PLACEMENT  1981       Medications Ordered Prior to Encounter[1]    Review of patient's allergies indicates:   Allergen Reactions    Bactrim [sulfamethoxazole-trimethoprim]     Ciprofloxacin Hives    Marcaine [bupivacaine]        Social History[2]    Family History   Problem Relation Name Age of Onset    Hyperlipidemia Mother Sepideh Cisneros     Diabetes Mother Sepideh Cisneros     Hypertension Mother Sepideh Cisneros     Heart disease Maternal Grandmother Marychuy Gregory     Prostate cancer Other      Alcohol abuse Father Leonard Cisneros     Depression Daughter Lisa Cisneros     Depression Daughter Maddi Cisneros     Early death Brother Mikie Cisneros     Cancer Neg Hx         Review of Systems   Constitutional:  Negative for appetite change, fever and unexpected weight change.   HENT:  Negative for postnasal drip, rhinorrhea, sneezing, sore throat and trouble swallowing.    Eyes:  Negative for visual disturbance.   Respiratory:  Negative for cough, shortness of breath and wheezing.    Cardiovascular:  Negative for chest pain, palpitations and leg swelling.   Gastrointestinal:  Positive for constipation and rectal pain. Negative for abdominal pain, blood in stool, diarrhea, nausea and vomiting.   Genitourinary:  Negative for dysuria.   Musculoskeletal:  Negative for arthralgias, joint swelling and myalgias.   Skin:  Negative for color change, pallor and rash.   Neurological:  Negative for weakness, light-headedness, numbness and headaches.   Hematological:  Negative for adenopathy. Does not bruise/bleed easily.   Psychiatric/Behavioral:  Negative for agitation.            Objective:   Vitals:   Vitals:    07/15/25 1409   BP: 125/85   Pulse: 97       Physical Exam  Vitals reviewed.   Constitutional:       General: She is not in  acute distress.     Appearance: She is not diaphoretic.   HENT:      Head: Normocephalic and atraumatic.      Mouth/Throat:      Pharynx: No oropharyngeal exudate.   Eyes:      General: No scleral icterus.        Right eye: No discharge.         Left eye: No discharge.      Conjunctiva/sclera: Conjunctivae normal.      Pupils: Pupils are equal, round, and reactive to light.   Cardiovascular:      Rate and Rhythm: Normal rate and regular rhythm.   Abdominal:      General: There is no distension.      Palpations: Abdomen is soft. There is no mass.      Tenderness: There is no abdominal tenderness. There is no guarding.   Musculoskeletal:         General: Normal range of motion.      Cervical back: Normal range of motion.   Skin:     General: Skin is warm and dry.      Coloration: Skin is not pale.      Findings: No erythema or rash.   Neurological:      Mental Status: She is alert and oriented to person, place, and time.       IMPRESSION     Problem List Items Addressed This Visit          Endocrine    Bariatric surgery status       GI    Constipation    Relevant Medications    plecanatide (TRULANCE) 3 mg Tab    Other Relevant Orders    Ambulatory referral/consult to Endo Procedure     Gastroesophageal reflux disease - Primary    Relevant Orders    Ambulatory referral/consult to Endo Procedure      Other Visit Diagnoses         Gastroparesis          Encounter for screening colonoscopy        Relevant Orders    Ambulatory referral/consult to Endo Procedure             PLANS:    - Schedule for EGD and colonoscopy with extended prep  - Will obtain OSH records of prior work-up  - Will do trial of Trulance 3 mg po daily. Possible side effects of diarrhea and/or abdominal cramping dicussed. Previously failed/did not tolerate Linzess and Amitiza  - Continue with PPI for now  - Strict reflux precautions- avoidance of caffeine/chocolate/mints, HOB elevation at night and avoidance of eating at least  3 hours before bedtime    OSH records received:   EGD 2021- normal, sleeve gastrectomy  [facility] 03/2019- Unremarkable  Meds prescribed: Remeron, Bentyl, carafate, Amitiza 8 mcg po BID, Dexilant      Gastroesophageal reflux disease, unspecified whether esophagitis present  -     Ambulatory referral/consult to Gastroenterology  -     Ambulatory referral/consult to Endo Procedure ; Future; Expected date: 07/16/2025    Bariatric surgery status  -     Ambulatory referral/consult to Gastroenterology    Gastroparesis  -     Ambulatory referral/consult to Gastroenterology    Chronic idiopathic constipation  -     plecanatide (TRULANCE) 3 mg Tab; Take 1 tablet (3 mg total) by mouth once daily.  Dispense: 30 tablet; Refill: 3  -     Ambulatory referral/consult to Endo Procedure ; Future; Expected date: 07/16/2025    Encounter for screening colonoscopy  -     Ambulatory referral/consult to Endo Procedure ; Future; Expected date: 07/16/2025    Other orders  -     sodium,potassium,mag sulfates (SUPREP BOWEL PREP KIT) 17.5-3.13-1.6 gram SolR; Take 177 mLs by mouth once daily. for 2 days  Dispense: 1 each; Refill: 0      Dayami Shelby MD  Gastroenterology and Hepatology             [1]   Current Outpatient Medications on File Prior to Visit   Medication Sig Dispense Refill    buPROPion (WELLBUTRIN) 100 MG tablet Take 1 tablet (100 mg total) by mouth 2 (two) times daily. 60 tablet 11    esomeprazole (NEXIUM) 40 MG capsule Take 1 capsule (40 mg total) by mouth before breakfast. 30 capsule 11    mirabegron (MYRBETRIQ) 50 mg Tb24 Take 1 tablet (50 mg total) by mouth once daily. 90 tablet 3    mupirocin (BACTROBAN) 2 % ointment Apply topically 4 (four) times daily. 30 g 0    ondansetron (ZOFRAN-ODT) 8 MG TbDL Take 1 tablet (8 mg total) by mouth every 6 (six) hours as needed (nausea). 30 tablet 11    tirzepatide, weight loss, (ZEPBOUND) 5 mg/0.5 mL PnIj Inject 5 mg into the skin every 7 days. 2 mL 2     topiramate (TOPAMAX) 50 MG tablet Take 1 tablet (50 mg total) by mouth every morning. 90 tablet 3    valACYclovir (VALTREX) 500 MG tablet TAKE 1 TABLET DAILY FOR SUPPRESSION. TAKE 1 TABLET TWICE A DAY IF OUTBREAK 108 tablet 3    clindamycin (CLEOCIN) 300 MG capsule Take 1 capsule (300 mg total) by mouth every 6 (six) hours. for 7 days (Patient not taking: Reported on 7/15/2025) 28 capsule 0    fluconazole (DIFLUCAN) 150 MG Tab Take 1 tablet (150 mg total) by mouth every 72 hours. (Patient not taking: Reported on 7/15/2025) 2 tablet 0     No current facility-administered medications on file prior to visit.   [2]   Social History  Socioeconomic History    Marital status:      Spouse name: Nate    Number of children: 3   Tobacco Use    Smoking status: Never     Passive exposure: Never    Smokeless tobacco: Never   Substance and Sexual Activity    Alcohol use: No    Drug use: Never    Sexual activity: Yes     Partners: Male     Birth control/protection: Condom, See Surgical Hx     Social Drivers of Health     Financial Resource Strain: Low Risk  (3/12/2025)    Overall Financial Resource Strain (CARDIA)     Difficulty of Paying Living Expenses: Not hard at all   Food Insecurity: No Food Insecurity (3/12/2025)    Hunger Vital Sign     Worried About Running Out of Food in the Last Year: Never true     Ran Out of Food in the Last Year: Never true   Transportation Needs: No Transportation Needs (3/12/2025)    PRAPARE - Transportation     Lack of Transportation (Medical): No     Lack of Transportation (Non-Medical): No   Physical Activity: Insufficiently Active (3/12/2025)    Exercise Vital Sign     Days of Exercise per Week: 2 days     Minutes of Exercise per Session: 30 min   Stress: Stress Concern Present (3/12/2025)    [employer] [place] [employer] - Occupational Stress Questionnaire     Feeling of Stress : To some extent   Housing Stability: Low Risk  (3/12/2025)    Housing Stability Vital Sign      Unable to Pay for Housing in the Last Year: No     Number of Times Moved in the Last Year: 0     Homeless in the Last Year: No

## 2025-07-16 ENCOUNTER — HOSPITAL ENCOUNTER (OUTPATIENT)
Dept: PREADMISSION TESTING | Facility: HOSPITAL | Age: 45
Discharge: HOME | End: 2025-07-16
Attending: INTERNAL MEDICINE
Payer: COMMERCIAL

## 2025-07-16 DIAGNOSIS — K59.04 CHRONIC IDIOPATHIC CONSTIPATION: ICD-10-CM

## 2025-07-16 DIAGNOSIS — Z12.11 ENCOUNTER FOR SCREENING COLONOSCOPY: ICD-10-CM

## 2025-07-16 DIAGNOSIS — K21.9 GASTROESOPHAGEAL REFLUX DISEASE, UNSPECIFIED WHETHER ESOPHAGITIS PRESENT: Primary | ICD-10-CM

## 2025-07-21 ENCOUNTER — PATIENT MESSAGE (OUTPATIENT)
Dept: ADMINISTRATIVE | Facility: OTHER | Age: 45
End: 2025-07-21
Payer: COMMERCIAL

## 2025-07-23 NOTE — PROGRESS NOTES
Patient ID: Laura Haddad is a 44 y.o. Black or  female    Subjective  Chief Complaint: patient presents for medical weight loss management.    Co-morbidities: DLD    HPI: Patient [facility] Zep[facility] with [facility] in May 2025 and is currently managed on Zepbound 5 mg.    Tolerance to current therapy:  Denies nausea, vomiting, diarrhea, constipation, abdominal pain    Weight loss history:  Starting weight:    4/24/2025   Recent Readings    Weight (lbs) 220.6 lb    BMI 31.65 BMI      Current weight:    7/21/2025   Recent Readings    Weight (lbs) 208 lb    BMI 29.84 BMI      % weight loss since GLP-1 initiation: 5.7 %    Objective  Lab Results   Component Value Date     03/30/2023     09/03/2022     05/06/2022     Lab Results   Component Value Date    K 3.7 03/30/2023    K 3.9 09/03/2022    K 3.6 05/06/2022     Lab Results   Component Value Date     12/04/2018     Lab Results   Component Value Date    CO2 26 03/30/2023    CO2 26 09/03/2022    CO2 26 05/06/2022     Lab Results   Component Value Date    BUN 17 03/30/2023    BUN 12 09/03/2022    BUN 14 05/06/2022     Lab Results   Component Value Date    GLU 88 03/30/2023    GLU 80 09/03/2022    GLU 93 05/06/2022     Lab Results   Component Value Date    CALCIUM 8.7 03/30/2023    CALCIUM 8.4 09/03/2022    CALCIUM 8.8 05/06/2022     Lab Results   Component Value Date    PROT 7.3 12/04/2018     Lab Results   Component Value Date    ALBUMIN 4.0 12/27/2021    ALBUMIN 3.3 07/17/2021    ALBUMIN 3.7 05/11/2021     Lab Results   Component Value Date    BILITOT 0.3 12/04/2018     Lab Results   Component Value Date    AST 22 12/27/2021    AST 33 07/17/2021    AST 24 05/11/2021     Lab Results   Component Value Date    ALT 19 12/27/2021    ALT 33 07/17/2021    ALT 10 05/11/2021     Lab Results   Component Value Date    ANIONGAP 7 03/30/2023    ANIONGAP 11 09/03/2022    ANIONGAP 11 05/06/2022     Lab Results   Component Value  Date    CREATININE 0.83 03/30/2023    CREATININE 0.9 09/03/2022    CREATININE 0.9 05/06/2022     Lab Results   Component Value Date    EGFRNORACEVR >60 03/30/2023    EGFRNORACEVR >60 09/03/2022    EGFRNORACEVR >60 05/06/2022     Assessment/Plan  - Increase to Zepbound 5 mg SQ weekly  - RTC in 3 months for follow-up evaluation      Patient consented to pharmacist management via collaborative practice.

## 2025-07-25 ENCOUNTER — OFFICE VISIT (OUTPATIENT)
Dept: INTERNAL MEDICINE | Facility: CLINIC | Age: 45
End: 2025-07-25
Payer: COMMERCIAL

## 2025-07-25 ENCOUNTER — PATIENT MESSAGE (OUTPATIENT)
Dept: INTERNAL MEDICINE | Facility: CLINIC | Age: 45
End: 2025-07-25

## 2025-07-25 RX ORDER — TIRZEPATIDE 5 MG/.5ML
5 INJECTION, SOLUTION SUBCUTANEOUS
Qty: 2 ML | Refills: 5 | Status: ACTIVE | OUTPATIENT
Start: 2025-07-25

## 2025-07-28 ENCOUNTER — NURSE TRIAGE (OUTPATIENT)
Dept: ADMINISTRATIVE | Facility: CLINIC | Age: 45
End: 2025-07-28
Payer: COMMERCIAL

## 2025-07-28 ENCOUNTER — PATIENT MESSAGE (OUTPATIENT)
Dept: GASTROENTEROLOGY | Facility: CLINIC | Age: 45
End: 2025-07-28
Payer: COMMERCIAL

## 2025-07-28 NOTE — TELEPHONE ENCOUNTER
"Reason for Disposition   [1] Sore throat is the only symptom AND [2] sore throat present < 48 hours    Additional Information   Negative: SEVERE difficulty breathing (e.g., struggling for each breath, speaks in single words, stridor)   Negative: Sounds like a life-threatening emergency to the triager   Negative: SEVERE PAIN WITH DYSPHAGIA (e.g., can't swallow any liquids, or drooling)   Negative: Unable to open mouth completely   Negative: Fever > 104 F (40 C)   Negative: [1] Difficulty breathing AND [2] not severe   Negative: [1] Drinking very little AND [2] dehydration suspected (e.g., no urine > 12 hours, very dry mouth, very lightheaded)   Negative: Patient sounds very sick or weak to the triager   Negative: SEVERE throat pain (e.g., excruciating)   Negative: [1] Pus on tonsils (back of throat) AND [2] fever AND [3] swollen neck lymph nodes ("glands")   Negative: [1] Rash AND [2] widespread (especially chest and abdomen)   Negative: Earache also present   Negative: Fever present > 3 days (72 hours)   Negative: Diabetes mellitus or weak immune system (e.g., HIV positive, cancer chemo, splenectomy, organ transplant, chronic steroids)   Negative: History of rheumatic fever   Negative: [1] Adult is leaving on a trip AND [2] requests an antibiotic NOW   Negative: [1] Positive throat culture or rapid strep test (according to lab, PCP, caller, etc.) AND [2] NO standing order to call in prescription for antibiotic   Negative: [1] Exposure to family member (or spouse or boyfriend/girlfriend) with test-proven strep AND [2] within last 10 days   Negative: [1] Sore throat is the only symptom AND [2] present > 48 hours   Negative: [1] Sore throat with cough/cold symptoms AND [2] present > 5 days   Negative: Patient is worried they have a sexually transmitted infection (e.g., sore throat after having unprotected oral sex)    Protocols used: Sore Throat-A-  Pt states she has a mild sore throat. States she went to Urgent Care " yesterday and tested negative for COVID and strep. Pt states she is scheduled for an EGD and colonoscopy Wednesday. States she does not want to start her prep today until she gets confirmation from the office nurse that she can still have the EGD with a sore throat. Advised per the Triage protocol Home Care advice. Instructed to call [facility] back if symptoms worsen. Advised a message will be sent to the office staff to contact her when the office opens. Pt verbalized understanding.

## 2025-07-28 NOTE — TELEPHONE ENCOUNTER
Spoke with patient and she states she was just letting the staff know she was experiencing a mild sore throat at the time. Patient states she is feeling fine today. Message sent to the provider to inform.

## 2025-07-28 NOTE — TELEPHONE ENCOUNTER
Spoke with patient and patient states she will continue with her procedure moving forward. Provider made aware as well.

## 2025-07-29 ENCOUNTER — ANESTHESIA EVENT (OUTPATIENT)
Dept: ENDOSCOPY | Facility: HOSPITAL | Age: 45
End: 2025-07-29
Payer: COMMERCIAL

## 2025-07-29 NOTE — ANESTHESIA PREPROCEDURE EVALUATION
07/29/2025  Laura Haddad is a 44 y.o., female.      Pre-op Assessment    I have reviewed the Patient Summary Reports.     I have reviewed the Nursing Notes. I have reviewed the NPO Status.   I have reviewed the Medications.     Review of Systems  Anesthesia Hx:  No problems with previous Anesthesia   History of prior surgery of interest to airway management or planning:  Previous anesthesia: General        Denies Family Hx of Anesthesia complications.    Denies Personal Hx of Anesthesia complications.                    Social:  Non-Smoker, No Alcohol Use       Cardiovascular:                hyperlipidemia                               Hepatic/GI:     GERD   S/p gastric sleeve             Neurological:    Neuromuscular Disease,  Headaches Seizures    Lumbar radiculopathy                             Endocrine:    Hyperthyroidism         Psych:  Psychiatric History anxiety depression                Physical Exam  General: Well nourished, Cooperative, Alert and Oriented    Airway:  Mallampati: II   Mouth Opening: Normal  TM Distance: Normal  Tongue: Normal  Neck ROM: Normal ROM    Dental:  Intact        Anesthesia Plan  Type of Anesthesia, risks & benefits discussed:    Anesthesia Type: Gen Natural Airway  Intra-op Monitoring Plan: Standard ASA Monitors  Post Op Pain Control Plan: multimodal analgesia  Induction:  IV  Informed Consent: Informed consent signed with the Patient and all parties understand the risks and agree with anesthesia plan.  All questions answered. Patient consented to blood products? No  ASA Score: 2  Day of Surgery Review of History & Physical: H&P Update referred to the surgeon/provider.    Ready For Surgery From Anesthesia Perspective.     .

## 2025-07-30 ENCOUNTER — HOSPITAL ENCOUNTER (OUTPATIENT)
Dept: ENDOSCOPY | Facility: HOSPITAL | Age: 45
Discharge: HOME | End: 2025-07-30
Attending: INTERNAL MEDICINE | Admitting: INTERNAL MEDICINE
Payer: COMMERCIAL

## 2025-07-30 ENCOUNTER — ANESTHESIA (OUTPATIENT)
Dept: ENDOSCOPY | Facility: HOSPITAL | Age: 45
End: 2025-07-30
Payer: COMMERCIAL

## 2025-07-30 DIAGNOSIS — Z12.11 ENCOUNTER FOR SCREENING COLONOSCOPY: ICD-10-CM

## 2025-07-30 DIAGNOSIS — K59.04 CHRONIC IDIOPATHIC CONSTIPATION: ICD-10-CM

## 2025-07-30 DIAGNOSIS — K21.9 GASTROESOPHAGEAL REFLUX DISEASE, UNSPECIFIED WHETHER ESOPHAGITIS PRESENT: Primary | ICD-10-CM

## 2025-07-30 PROCEDURE — 37000009 HC ANESTHESIA EA ADD 15 MINS

## 2025-07-30 PROCEDURE — 27201012 HC FORCEPS, HOT/COLD, DISP: Performed by: INTERNAL MEDICINE

## 2025-07-30 PROCEDURE — 88305 TISSUE EXAM BY PATHOLOGIST: CPT | Mod: TC | Performed by: INTERNAL MEDICINE

## 2025-07-30 PROCEDURE — 25000003 PHARM REV CODE 250: Performed by: INTERNAL MEDICINE

## 2025-07-30 PROCEDURE — 63600175 PHARM REV CODE 636 W HCPCS: Performed by: NURSE ANESTHETIST, CERTIFIED REGISTERED

## 2025-07-30 PROCEDURE — 37000008 HC ANESTHESIA 1ST 15 MINUTES

## 2025-07-30 PROCEDURE — 63600175 PHARM REV CODE 636 W HCPCS: Performed by: INTERNAL MEDICINE

## 2025-07-30 RX ORDER — LIDOCAINE HYDROCHLORIDE 20 MG/ML
INJECTION INTRAVENOUS
Status: DISCONTINUED | OUTPATIENT
Start: 2025-07-30 | End: 2025-07-30

## 2025-07-30 RX ORDER — SODIUM CHLORIDE 9 MG/ML
INJECTION, SOLUTION INTRAVENOUS CONTINUOUS
Status: DISCONTINUED | OUTPATIENT
Start: 2025-07-30 | End: 2025-07-31 | Stop reason: HOSPADM

## 2025-07-30 RX ORDER — PROPOFOL 10 MG/ML
VIAL (ML) INTRAVENOUS
Status: DISCONTINUED | OUTPATIENT
Start: 2025-07-30 | End: 2025-07-30

## 2025-07-30 RX ORDER — SODIUM CHLORIDE, SODIUM LACTATE, POTASSIUM CHLORIDE, CALCIUM CHLORIDE 600; 310; 30; 20 MG/100ML; MG/100ML; MG/100ML; MG/100ML
INJECTION, SOLUTION INTRAVENOUS CONTINUOUS
Status: DISCONTINUED | OUTPATIENT
Start: 2025-07-30 | End: 2025-07-31 | Stop reason: HOSPADM

## 2025-07-30 RX ORDER — DEXTROMETHORPHAN/PSEUDOEPHED 2.5-7.5/.8
DROPS ORAL
Status: COMPLETED | OUTPATIENT
Start: 2025-07-30 | End: 2025-07-30

## 2025-07-30 RX ADMIN — SIMETHICONE 200 MG: 20 SUSPENSION/ DROPS ORAL at 08:07

## 2025-07-30 RX ADMIN — SODIUM CHLORIDE, POTASSIUM CHLORIDE, SODIUM LACTATE AND CALCIUM CHLORIDE: 600; 310; 30; 20 INJECTION, SOLUTION INTRAVENOUS at 08:07

## 2025-07-30 RX ADMIN — PROPOFOL 25 MG: 10 INJECTION, EMULSION INTRAVENOUS at 08:07

## 2025-07-30 RX ADMIN — PROPOFOL 50 MG: 10 INJECTION, EMULSION INTRAVENOUS at 08:07

## 2025-07-30 RX ADMIN — LIDOCAINE HYDROCHLORIDE 100 MG: 20 INJECTION INTRAVENOUS at 08:07

## 2025-07-30 RX ADMIN — PROPOFOL 10 MG: 10 INJECTION, EMULSION INTRAVENOUS at 08:07

## 2025-07-30 RX ADMIN — SODIUM CHLORIDE: 0.9 INJECTION, SOLUTION INTRAVENOUS at 08:07

## 2025-07-30 NOTE — DISCHARGE INSTRUCTIONS
During your procedure today, you received medications for sedation.  These   medications may affect your judgment, balance and coordination.  Therefore,   for 24 hours, you have the following restrictions:   - DO NOT drive a car, operate machinery, make legal/financial decisions,   sign important papers or drink alcohol.    ACTIVITY:  Today: no heavy lifting, straining or running due to procedural   sedation/anesthesia.  The following day: return to full activity including work.  DIET:  Eat and drink normally unless instructed otherwise.                TREATMENT FOR COMMON SIDE EFFECTS:  - Mild abdominal pain, nausea, belching, bloating or excessive gas:  rest,   eat lightly and use a heating pad.  - Sore Throat: treat with throat lozenges and/or gargle with warm salt   water.  - Because air was used during the procedure, expelling large amounts of air   from your rectum or belching is normal.  - If a bowel prep was taken, you may not have a bowel movement for 1-3 days.    This is normal.  SYMPTOMS TO WATCH FOR AND REPORT TO YOUR PHYSICIAN:  1. Abdominal pain or bloating, other than gas cramps.  2. Chest pain.  3. Back pain.  4. Signs of infection such as: chills or fever occurring within 24 hours   after the procedure.  5. Rectal bleeding, which would show as bright red, maroon, or black stools.   (A tablespoon of blood from the rectum is not serious, especially if   hemorrhoids are present.)  6. Vomiting.  7. Weakness or dizziness.  GO DIRECTLY TO THE NEAREST EMERGENCY ROOM IF YOU HAVE ANY OF THE FOLLOWING:                 Difficulty breathing              Chills and/or fever over 101 F              Persistent vomiting and/or vomiting blood              Severe abdominal pain              Severe chest pain              Black, tarry stools              Bleeding- more than one tablespoon              Any other symptom or condition that you feel may need urgent attention    Your doctor recommends these additional  instructions:  If any biopsies were taken, your doctors clinic will contact you in 1 to 2   weeks with any results.  - Discharge patient to home.   - Resume previous diet.   - Continue present medications.    - Repeat colonoscopy in _10_ years for surveillance.   - Return to referring physician as previously scheduled.   - Patient has a contact number available for emergencies.  The signs and   symptoms of potential delayed complications were discussed with the   patient.  Return to normal activities tomorrow.  Written discharge   instructions were provided to the patient.  If you have any questions about the above instructions, call the GI   department at [phone] or call the endoscopy unit at [phone]   from 7am until 3 pm.  [facility], EMERGENCY ROOM PHONE NUMBER:   [phone]  IF A COMPLICATION OR EMERGENCY SITUATION ARISES AND YOU ARE UNABLE TO REACH   YOUR PHYSICIAN - GO DIRECTLY TO THE EMERGENCY ROOM.  I have read or have had read to me these discharge instructions for my   procedure and have received a written copy.  I understand these   instructions and will follow-up with my physician if I have any questions.

## 2025-07-30 NOTE — TRANSFER OF CARE
"Anesthesia Transfer of Care Note    Patient: Laura Haddad    Procedure(s) Performed: * No procedures listed *    Patient location: PACU    Anesthesia Type: general    Transport from OR: Transported from OR on room air with adequate spontaneous ventilation    Post pain: adequate analgesia    Post assessment: no apparent anesthetic complications and tolerated procedure well    Post vital signs: stable    Level of consciousness: sedated    Nausea/Vomiting: no nausea/vomiting    Complications: none    Transfer of care protocol was followed      Last vitals: Visit Vitals  /82 (BP Location: Right arm, Patient Position: Sitting)   Pulse 86   Temp 36.6 °C (97.9 °F) (Temporal)   Resp 16   Ht 5' 10" (1.778 m)   Wt 92.7 kg (204 lb 4.1 oz)   SpO2 100%   Breastfeeding No   BMI 29.31 kg/m²     "

## 2025-07-30 NOTE — ANESTHESIA POSTPROCEDURE EVALUATION
Anesthesia Post Evaluation    Patient: Laura Haddad    Procedure(s) Performed: * No procedures listed *    Final Anesthesia Type: general      Patient location during evaluation: PACU  Patient participation: Yes- Able to Participate  Level of consciousness: awake and alert and oriented  Post-procedure vital signs: reviewed and stable  Pain management: adequate  Airway patency: patent    PONV status at discharge: No PONV  Anesthetic complications: no      Cardiovascular status: blood pressure returned to baseline, stable and hemodynamically stable  Respiratory status: unassisted  Hydration status: euvolemic  Follow-up not needed.              Vitals Value Taken Time   /75 07/30/25 08:53   Temp 36.3 °C (97.3 °F) 07/30/25 08:37   Pulse 79 07/30/25 08:54   Resp 52 07/30/25 08:54   SpO2 100 % 07/30/25 08:54   Vitals shown include unfiled device data.      No case tracking events are documented in the log.      Pain/Jakob Score: Jakob Score: 10 (7/30/2025  8:37 AM)

## 2025-07-30 NOTE — DISCHARGE SUMMARY
[facility] 1st Fl  Discharge Note  Short Stay    Colonoscopy  EGD      OUTCOME: Patient tolerated treatment/procedure well without complication and is now ready for discharge.    DISPOSITION: Home or Self Care    FINAL DIAGNOSIS:  Gastroesophageal reflux disease    FOLLOWUP: With primary care provider    DISCHARGE INSTRUCTIONS:  No discharge procedures on file.

## 2025-07-30 NOTE — H&P
Short Stay Endoscopy History and Physical    PCP - Deborah Branch MD    Procedure - EGD and colonoscopy  ASA - per anesthesia  Mallampati - per anesthesia  History of Anesthesia problems - no  Family history Anesthesia problems -  no     HPI:  This is a 44 y.o. female here for evaluation of :   Active Hospital Problems    Diagnosis  POA    *Gastroesophageal reflux disease [K21.9]  Yes    Encounter for screening colonoscopy [Z12.11]  Not Applicable    Constipation [K59.00]  Yes      Resolved Hospital Problems   No resolved problems to display.         Health Maintenance         Date Due Completion Date    Colorectal Cancer Screening Never done ---    COVID-19 Vaccine (4 - 2024-25 season) 09/01/2024 11/5/2021    Mammogram 09/20/2024 9/20/2023    Influenza Vaccine (1) 09/01/2025 9/10/2024    Hemoglobin A1c (Diabetic Prevention Screening) 12/27/2027 12/27/2024    Lipid Panel 04/30/2029 4/30/2024    TETANUS VACCINE 11/16/2030 11/16/2020    RSV Vaccine (Age 60+ and Pregnant patients) (1 - 1-dose 75+ series) 08/16/2054 ---              ROS:  CONSTITUTIONAL: Denies weight change,  fatigue, fevers, chills, night sweats.  CARDIOVASCULAR: Denies chest pain, shortness of breath, orthopnea and edema.  RESPIRATORY: Denies cough, hemoptysis, dyspnea, and wheezing.  GI: See HPI.    Medical History:   Past Medical History:   Diagnosis Date    Classical migraine     Fibromyalgia     Herpes simplex 2013    Insulin resistance     Mixed hyperlipidemia 9/11/2023    Overactive bladder     Seizure disorder     Subclinical hyperthyroidism 9/11/2023       Surgical History:   Past Surgical History:   Procedure Laterality Date    ABDOMINOPLASTY      ADENOIDECTOMY  1981    ANTERIOR VAGINAL REPAIR  2006    arm lift  Bilateral     AUGMENTATION OF BREAST      BREAST SURGERY  2009, 2020    BUTTOCK LIFT      CARPAL TUNNEL RELEASE  04/04/2023    CHOLECYSTECTOMY  2011    COSMETIC SURGERY  2020, 2021    CYSTOSCOPY  09/15/2022    Dr. Thania MONTANO  SURGERY      HAND SURGERY  04/2023    HYSTERECTOMY  2001    KNEE ARTHROSCOPY      LAPAROSCOPIC SLEEVE GASTRECTOMY  12/10/2018    LIPOSUCTION      REDUCTION OF BOTH BREASTS  2009    TONSILLECTOMY  1988    TVH  2001    uterine prolapse    TYMPANOSTOMY TUBE PLACEMENT  1981       Family History:   Family History   Problem Relation Name Age of Onset    Hyperlipidemia Mother Sepideh Cisneros     Diabetes Mother Sepideh Cisneros     Hypertension Mother Sepideh Cisneros     Heart disease Maternal Grandmother Marychuy Gregory     Prostate cancer Other      Alcohol abuse Father Leonard Cisneros     Depression Daughter Lisa Cisneros     Depression Daughter Maddi Cisneros     Early death Brother Mikie Cisneros     Cancer Neg Hx         Social History:   Social History[1]    Allergies:   Review of patient's allergies indicates:   Allergen Reactions    Bactrim [sulfamethoxazole-trimethoprim]     Ciprofloxacin Hives    Marcaine [bupivacaine]        Medications:   Medications Ordered Prior to Encounter[2]    Physical Exam:  Vital Signs:   Vitals:    07/30/25 0723   BP: 116/82   Pulse: 86   Resp: 16   Temp: 97.9 °F (36.6 °C)     General Appearance: Well appearing in no acute distress  ENT: OP clear  Chest: CTA B  CV: RRR, no m/r/g  Abd: s/nt/nd/nabs  Ext: no edema    Labs:Reviewed    IMP:  Active Hospital Problems    Diagnosis  POA    *Gastroesophageal reflux disease [K21.9]  Yes    Encounter for screening colonoscopy [Z12.11]  Not Applicable    Constipation [K59.00]  Yes      Resolved Hospital Problems   No resolved problems to display.         Plan:   I have explained the risks and benefits of upper endoscopy and colonoscopy to the patient including but not limited to bleeding, perforation, infection, and death. The patient wishes to proceed.         [1]   Social History  Tobacco Use    Smoking status: Never     Passive exposure: Never    Smokeless tobacco: Never   Substance Use Topics    Alcohol use: No    Drug use: Never   [2]   Current Outpatient  Medications on File Prior to Encounter   Medication Sig Dispense Refill    buPROPion (WELLBUTRIN) 100 MG tablet Take 1 tablet (100 mg total) by mouth 2 (two) times daily. 60 tablet 11    esomeprazole (NEXIUM) 40 MG capsule Take 1 capsule (40 mg total) by mouth before breakfast. 30 capsule 11    mirabegron (MYRBETRIQ) 50 mg Tb24 Take 1 tablet (50 mg total) by mouth once daily. 90 tablet 3    ondansetron (ZOFRAN-ODT) 8 MG TbDL Take 1 tablet (8 mg total) by mouth every 6 (six) hours as needed (nausea). 30 tablet 11    topiramate (TOPAMAX) 50 MG tablet Take 1 tablet (50 mg total) by mouth every morning. 90 tablet 3    valACYclovir (VALTREX) 500 MG tablet TAKE 1 TABLET DAILY FOR SUPPRESSION. TAKE 1 TABLET TWICE A DAY IF OUTBREAK 108 tablet 3    fluconazole (DIFLUCAN) 150 MG Tab Take 1 tablet (150 mg total) by mouth every 72 hours. (Patient not taking: Reported on 7/23/2025) 2 tablet 0    mupirocin (BACTROBAN) 2 % ointment Apply topically 4 (four) times daily. (Patient not taking: Reported on 7/23/2025) 30 g 0    plecanatide (TRULANCE) 3 mg Tab Take 1 tablet (3 mg total) by mouth once daily. (Patient not taking: Reported on 7/23/2025) 30 tablet 3    tirzepatide, weight loss, (ZEPBOUND) 5 mg/0.5 mL PnIj Inject 5 mg into the skin every 7 days. 2 mL 5     No current facility-administered medications on file prior to encounter.

## 2025-07-31 VITALS
DIASTOLIC BLOOD PRESSURE: 71 MMHG | RESPIRATION RATE: 55 BRPM | BODY MASS INDEX: 29.24 KG/M2 | HEART RATE: 83 BPM | TEMPERATURE: 97 F | SYSTOLIC BLOOD PRESSURE: 111 MMHG | OXYGEN SATURATION: 100 % | WEIGHT: 204.25 LBS | HEIGHT: 70 IN

## 2025-08-01 LAB
ESTROGEN SERPL-MCNC: NORMAL PG/ML
INSULIN SERPL-ACNC: NORMAL U[IU]/ML
LAB AP CLINICAL INFORMATION: NORMAL
LAB AP GROSS DESCRIPTION: NORMAL
LAB AP PERFORMING LOCATION(S): NORMAL
LAB AP REPORT FOOTNOTES: NORMAL

## 2025-08-04 PROBLEM — Z12.11 ENCOUNTER FOR SCREENING COLONOSCOPY: Status: RESOLVED | Noted: 2025-07-30 | Resolved: 2025-08-04

## 2025-08-13 ENCOUNTER — PATIENT MESSAGE (OUTPATIENT)
Dept: ADMINISTRATIVE | Facility: HOSPITAL | Age: 45
End: 2025-08-13
Payer: COMMERCIAL

## 2025-08-18 ENCOUNTER — PATIENT MESSAGE (OUTPATIENT)
Dept: ADMINISTRATIVE | Facility: OTHER | Age: 45
End: 2025-08-18
Payer: COMMERCIAL

## 2025-08-19 ENCOUNTER — PATIENT MESSAGE (OUTPATIENT)
Dept: PRIMARY CARE CLINIC | Facility: CLINIC | Age: 45
End: 2025-08-19
Payer: COMMERCIAL